# Patient Record
Sex: FEMALE | URBAN - METROPOLITAN AREA
[De-identification: names, ages, dates, MRNs, and addresses within clinical notes are randomized per-mention and may not be internally consistent; named-entity substitution may affect disease eponyms.]

---

## 2023-01-10 ENCOUNTER — TELEPHONE (OUTPATIENT)
Dept: BARIATRICS/WEIGHT MGMT | Age: 23
End: 2023-01-10

## 2023-02-08 ENCOUNTER — TELEPHONE (OUTPATIENT)
Dept: BARIATRICS/WEIGHT MGMT | Age: 23
End: 2023-02-08

## 2023-02-08 NOTE — TELEPHONE ENCOUNTER
Called as a new pt courtesy call - left message. Told patient to have new pt paperwork completely filled out, insurance card, id. If they didn't have the paperwork filled out and arrive on time may be rescheduled. Also stated if they didn't receive paperwork to let us know so we could get it to them another way. Left office number on message. Reminded patient of the [place] location.

## 2023-02-09 ENCOUNTER — OFFICE VISIT (OUTPATIENT)
Dept: BARIATRICS/WEIGHT MGMT | Age: 23
End: 2023-02-09

## 2023-02-09 VITALS
WEIGHT: 233 LBS | SYSTOLIC BLOOD PRESSURE: 122 MMHG | BODY MASS INDEX: 41.29 KG/M2 | HEIGHT: 63 IN | HEART RATE: 78 BPM | DIASTOLIC BLOOD PRESSURE: 72 MMHG

## 2023-02-09 DIAGNOSIS — E66.01 MORBID OBESITY WITH BMI OF 40.0-44.9, ADULT (HCC): Primary | ICD-10-CM

## 2023-02-09 PROCEDURE — 99999 PR OFFICE/OUTPT VISIT,PROCEDURE ONLY: CPT | Performed by: FAMILY MEDICINE

## 2023-02-09 NOTE — PROGRESS NOTES
Garfield Uriostegui is a 25 y.o. female with a date of birth of 2000. Vitals:    02/09/23 1027   Weight: 233 lb (105.7 kg)   Height: 5' 3\" (1.6 m)    BMI: Body mass index is 41.27 kg/m². Obesity Classification: Class III    Weight History: Wt Readings from Last 3 Encounters:   02/09/23 233 lb (105.7 kg)       Patient's lowest adult weight was 170-180 lb at age 25. Patient's highest adult weight was 250 lb at age 25. Patient has participated in the following weight loss programs: , metabolife/herbalife and calorie restriction. Patient has not participated in meal replacement/liquid diets. Patient has not participated in weight loss medications. Patient is lactose intolerant but does not currently avoid anything as nothing bothers her if she consumes it. Patient does not have Adventist/cultural food concerns. Patient does not have food allergies. 24 hour recall/food frequency chart: Pt uses [employer] aamir to track her intakes and aims for 6465-1527 calories but ends up eating around ~2852-0520 calories per day. Breakfast: nothing   Snack: nothing OR yogurt and protein granola   Lunch: forgot lunch so ate at [employer] at work - 1 cup pasta with 1/4 cup marinara sauce with 2 fried chicken tenders and 1 apple OR packed lunch - meatballs with 2 cuties   Snack: nothing   Dinner: [employer] - 6 oz sirloin with 1 roll with house salad with ranch and green beans OR chicken with portioned out rice cup and broccoli   Snack: nothing   Drinks throughout the day: Drinks water, propel. Drinks about 64 oz per day. Do you drink alcohol? yes. How often/how much alcohol do you drink: 1-2 glasses of wine 1-2 times per month. Patient does have a hx of binge eating disorder but not as much currently. Patient does have grazing. Patient does not have night eating. Patient does have a history of emotional eating or eating out of boredom.     Pt exercises - does Biggest Loser workout DVD 3 x week for 30-45 minutes + stretching. Goals  Weight: 170 lbs   Health Improvement:  lose weight, get healthier, improve breathing     Assessment  Nutritional Needs: RMR=(9.99 x 105.7) + (6.25 x 160) - (4.92 x 22 y.o.) -161 = 1786 kcal x 1.4 (sedentary activity factor)= 2500 kcal - 1000 (for 2 lb weight loss/week)= 1500 kcal.    Plan  Plan/Recommendations: General weight loss/lifestyle modification strategies discussed (elicit support from others; identify saboteurs; non-food rewards, etc). Start 1500 kcal LC meal plan - provided and reviewed   Include breakfast and snack consistently - discussed goal of eating 4-5 x day - avoid grazing   Continue to track her intakes - discussed aiming for meal plan parameters   Limit liquid calories - alcohol   Avoid fried foods and choose leaner food prep methods / healthier alternatives   Encouraged pt to attend SG's to help with behavioral triggers   Continue with exercise   Optifast: not as interested in   Diet Medications: interested in     Handouts: 1500 kcal LC meal plan    PES Statement:  Overweight/Obesity related to increased caloric intake and decreased physical activity as evidenced by BMI. Body mass index is 41.27 kg/m². Will follow up as necessary.     Elham Nixon, RD, LD

## 2023-02-15 ENCOUNTER — TELEMEDICINE (OUTPATIENT)
Dept: BARIATRICS/WEIGHT MGMT | Age: 23
End: 2023-02-15
Payer: COMMERCIAL

## 2023-02-15 DIAGNOSIS — E66.01 MORBID OBESITY WITH BMI OF 40.0-44.9, ADULT (HCC): Primary | ICD-10-CM

## 2023-02-15 DIAGNOSIS — Z71.3 DIETARY COUNSELING AND SURVEILLANCE: ICD-10-CM

## 2023-02-15 PROCEDURE — 99204 OFFICE O/P NEW MOD 45 MIN: CPT | Performed by: FAMILY MEDICINE

## 2023-02-15 ASSESSMENT — ENCOUNTER SYMPTOMS
NAUSEA: 0
CHEST TIGHTNESS: 0
BLOOD IN STOOL: 0
PHOTOPHOBIA: 0
EYE PAIN: 0
WHEEZING: 0
COUGH: 0
ABDOMINAL PAIN: 0
APNEA: 0
CHOKING: 0
DIARRHEA: 0
VOMITING: 0
ABDOMINAL DISTENTION: 0
CONSTIPATION: 0
SHORTNESS OF BREATH: 0

## 2023-02-15 NOTE — PROGRESS NOTES
Patient: Federico Richard     Encounter Date: 2/15/2023    YOB: 2000               Age: 25 y.o. Patient identification was verified at the start of the visit. Patient-Reported Vitals 2/15/2023   Patient-Reported Height 5,4   Patient-Reported Pulse 76         BP Readings from Last 1 Encounters:   02/09/23 122/72       BMI Readings from Last 1 Encounters:   02/09/23 41.27 kg/m²       Pulse Readings from Last 1 Encounters:   02/09/23 78                                             Wt Readings from Last 3 Encounters:   02/09/23 233 lb (105.7 kg)        Chief Complaint   Patient presents with    Bariatric, Initial Visit     MWM- NP        HPI:    25 y.o. female presents to establish care via video visit. The patient's medical history is significant for class III obesity. The patient has a long-standing history of obesity which started gradually. The problem is severe. The patient has been gaining weight. Risk factors include annual weight gain of >2 lbs (1 kg)/ year and sedentary lifestyle. Aggravating factors include poor diet and lack of physical activity. The patient has tried various diet/exercise plans which have been ineffective in the long-run. she is motivated to start losing weight to help improve her health. When did you become overweight? [] Childhood   [x] Teens   [] Adulthood   [] Pregnancy   [] Menopause    Highest adult weight: 250 pounds at current age     Triggers for weight gain? [x] Stress   [] Illness   [] Medications   [] Travel  []Injury     [] Nightshift work   [] Insomnia  [] No specific triggers   [] Other    Food triggers:   [x] Stress   [x] Boredom   [x] Fast food   [x] Eating out   [] Seeking reward   [] Social     Have you ever taken prescription medications to help you lose weight?    [] Yes  [x] No    Have you ever been on a meal replacement program?  [] Yes  [x] No    How many hours of sleep do you get each night?  [] <6 hours  [x] 6-8 hours  [] >8 hours    Do you have sleep apnea? [] Yes  [] No   [x] Unknown     Do you have frequent awakenings, difficulty falling asleep, feeling fatigued, unrefreshed sleep, daytime sleepiness? [] Yes  [x] No     The patient denies any significant cardiac or psychiatric disease. The patient denies a history thyroid disease. The patient denies a history of glaucoma. The patient denies a history of nephrolithiasis. The patient denies a history of seizure disorders/epiliepsy. Dietitian's assessment reviewed and addressed with patient. Reviewed:  [x] Nutrition and the importance of regular protein intake  [x] Hidden CHO/carbohydrate sources  [x] Alcohol use  [x] Tobacco use  [x] Drug use- Denies   [x] Importance of exercise and reducing sedentary time        Controlled Substance Monitoring:     No flowsheet data found. Allergies   Allergen Reactions    Amoxicillin-Pot Clavulanate     Macrodantin [Nitrofurantoin]     Sulfa Antibiotics     Penicillins Hives, Itching and Rash       No current outpatient medications on file. Patient Active Problem List   Diagnosis    Morbid obesity with BMI of 40.0-44.9, adult ([facility] [address]       Past Medical History:   Diagnosis Date    Asthma     Morbid obesity with BMI of 40.0-44.9, adult ([facility] [address]     MTHFR (methylene THF reductase) deficiency and homocystinuria (HCC)     Obesity (BMI 30-39. 9)        Past Surgical History:   Procedure Laterality Date    APPENDECTOMY      TONSILLECTOMY AND ADENOIDECTOMY         Family History   Problem Relation Age of Onset    Other Mother     Lupus Mother     Rheum Arthritis Mother     Diabetes Maternal Grandfather     Breast Cancer Paternal Grandmother        Review of Systems   Constitutional:  Negative for fatigue. Eyes:  Negative for photophobia, pain and visual disturbance. Respiratory:  Negative for apnea, cough, choking, chest tightness, shortness of breath and wheezing. Cardiovascular:  Negative for chest pain, palpitations and leg swelling. Gastrointestinal:  Negative for abdominal distention, abdominal pain, blood in stool, constipation, diarrhea, nausea and vomiting. Endocrine: Negative for cold intolerance and heat intolerance. Musculoskeletal:  Negative for arthralgias and myalgias. Skin:  Negative for rash. Neurological:  Negative for dizziness, tremors, syncope, weakness, numbness and headaches. Psychiatric/Behavioral:  Negative for agitation, confusion, decreased concentration, dysphoric mood, hallucinations, sleep disturbance and suicidal ideas. The patient is not nervous/anxious and is not hyperactive. Physical Exam  Constitutional:       Appearance: She is well-developed. HENT:      Head: Normocephalic. Eyes:      Conjunctiva/sclera: Conjunctivae normal.   Abdominal:      General: Abdomen is protuberant. Musculoskeletal:         General: No swelling. Neurological:      Mental Status: She is alert and oriented to person, place, and time. Psychiatric:         Mood and Affect: Mood normal.         Behavior: Behavior normal.         Thought Content: Thought content normal.         Judgment: Judgment normal.       No results found for any previous visit. Assessment and Plan:    1. Morbid obesity with BMI of 40.0-44.9, adult ([facility] [address]  Heavily counseled on the importance of therapeutic lifestyle changes through diet and exercise. The patient understands that the goal of treatment is to reach and stay at a healthy weight. The initial treatment goal is to lose at least 5-10% of her body weight in 12 weeks. This will require changes in eating habits, increased physical activity, and behavior changes. Counseled on low carb/majo diet. Patient handouts and education material provided  by the dietitian and reviewed in detail with the patient. Discussed available treatment options in addition to lifestyle changes including medications or OPTIFAST. She is interested in anti-obesity medications.  Qsymia or Contrave may be recommended at the next visit depending on her progress and lab results. Explained that medications/meal replacements are most effective as part of a comprehensive treatment plan that includes proper nutrition, physical activity, and behavior modification. The patient understands that she will need close follow-ups every 2-4 weeks if she starts treatment. Depending on the patient's success with these changes, she may also be a good candidate for bariatric surgery down the line. Follow-up in 2 weeks to discuss lab results and treatment plan. Patient advised that it is her responsibility to follow up on any ordered tests/lab results. Patient understands and agrees with the plan. - Comprehensive Metabolic Panel; Future  - EKG 12 Lead; Future  - Hemoglobin A1C; Future  - Lipid Panel; Future  - TSH with Reflex; Future  - Vitamin B12 & Folate; Future  - Vitamin D 25 Hydroxy; Future    2. Dietary counseling and surveillance  Start with meal plan as recommended. Avoid skipping meals. Avoid evening/nighttime snacking. Use distraction techniques to avoid boredom/stress eating. Plan/prep meals ahead of time. Avoid soda/juice/sugary drinks. Be mindful of portion sizes.          Nutrition:  [] LCHF/Ketogenic [x] Low carb/low-calorie diet [] Low-calorie diet     []Maintenance        FITTE:   [x] Cardio [] Resistance/stength exercises   [x] ACSM recommendations (150 minutes/week)           Behavior:   [x] Motivational interviewing performed    [] Referral for counseling  [x] Discussed strategies to overcome habits/challenges for focus      [x] Stress management   [x] Stimulus control  [x] Sleep hygiene      Orders Placed This Encounter   Procedures    Comprehensive Metabolic Panel     Standing Status:   Future     Standing Expiration Date:   2/15/2024    Hemoglobin A1C     Standing Status:   Future     Standing Expiration Date:   2/15/2024    Lipid Panel     Standing Status:   Future     Standing Expiration Date: 2/15/2024    TSH with Reflex     Standing Status:   Future     Standing Expiration Date:   2/15/2024    Vitamin B12 & Folate     Standing Status:   Future     Standing Expiration Date:   2/15/2024    Vitamin D 25 Hydroxy     Standing Status:   Future     Standing Expiration Date:   2/15/2024    EKG 12 Lead     Standing Status:   Future     Standing Expiration Date:   2/15/2024     Order Specific Question:   Reason for Exam?     Answer: Other         No follow-ups on file. Vito Colunga is a 25 y.o. female being evaluated by a Virtual Visit (video visit) encounter to address concerns as mentioned above. A caregiver was present when appropriate. Due to this being a TeleHealth encounter (During [facility] public health emergency), evaluation of the following organ systems was limited: Vitals/Constitutional/EENT/Resp/CV/GI//MS/Neuro/Skin/Heme-Lymph-Imm. Pursuant to the emergency declaration under the [address] [place] [address] and the [employer] and [employer] Act, this Virtual Visit was conducted with patient's (and/or legal guardian's) consent, to reduce the patient's risk of exposure to COVID-19 and provide necessary medical care. The patient (and/or legal guardian) has also been advised to contact this office for worsening conditions or problems, and seek emergency medical treatment and/or call 911 if deemed necessary. Services were provided through a video synchronous discussion virtually to substitute for in-person clinic visit. Patient and provider were located at their individual homes. Vito Colunga, was evaluated through a synchronous (real-time) audio-video encounter. The patient (or guardian if applicable) is aware that this is a billable service, which includes applicable co-pays. This Virtual Visit was conducted with patient's (and/or legal guardian's) consent.  The visit was conducted pursuant to the emergency declaration under the [facility] authority and the [employer] and [employer] General Act. Patient identification was verified, and a caregiver was present when appropriate. The patient was located at Home: [address] [place] [address]  Provider was located at Home ([facility] [place]         Total time spent for this encounter: Not billed by time    --Konrad Fagan MD on 2/15/2023 at 12:58 PM    An electronic signature was used to authenticate this note.

## 2023-02-24 ENCOUNTER — HOSPITAL ENCOUNTER (OUTPATIENT)
Age: 23
Discharge: HOME | End: 2023-02-24
Payer: COMMERCIAL

## 2023-02-24 DIAGNOSIS — E66.01 MORBID OBESITY WITH BMI OF 40.0-44.9, ADULT (HCC): ICD-10-CM

## 2023-02-24 LAB
A/G RATIO: 1.5 (ref 1.1–2.2)
ALBUMIN SERPL-MCNC: 4.6 G/DL (ref 3.4–5)
ALP BLD-CCNC: 49 U/L (ref 40–129)
ALT SERPL-CCNC: 11 U/L (ref 10–40)
ANION GAP SERPL CALCULATED.3IONS-SCNC: 15 MMOL/L (ref 3–16)
AST SERPL-CCNC: 18 U/L (ref 15–37)
BILIRUB SERPL-MCNC: 0.6 MG/DL (ref 0–1)
BUN BLDV-MCNC: 8 MG/DL (ref 7–20)
CALCIUM SERPL-MCNC: 9.7 MG/DL (ref 8.3–10.6)
CHLORIDE BLD-SCNC: 102 MMOL/L (ref 99–110)
CHOLESTEROL, TOTAL: 148 MG/DL (ref 0–199)
CO2: 23 MMOL/L (ref 21–32)
CREAT SERPL-MCNC: 0.6 MG/DL (ref 0.6–1.1)
EKG ATRIAL RATE: 74 BPM
EKG DIAGNOSIS: NORMAL
EKG P AXIS: 17 DEGREES
EKG P-R INTERVAL: 146 MS
EKG Q-T INTERVAL: 370 MS
EKG QRS DURATION: 84 MS
EKG QTC CALCULATION (BAZETT): 410 MS
EKG R AXIS: 23 DEGREES
EKG T AXIS: 21 DEGREES
EKG VENTRICULAR RATE: 74 BPM
FOLATE: 9.23 NG/ML (ref 4.78–24.2)
GFR SERPL CREATININE-BSD FRML MDRD: >60 ML/MIN/{1.73_M2}
GLUCOSE BLD-MCNC: 107 MG/DL (ref 70–99)
HDLC SERPL-MCNC: 52 MG/DL (ref 40–60)
LDL CHOLESTEROL CALCULATED: 87 MG/DL
POTASSIUM SERPL-SCNC: 4.4 MMOL/L (ref 3.5–5.1)
SODIUM BLD-SCNC: 140 MMOL/L (ref 136–145)
TOTAL PROTEIN: 7.7 G/DL (ref 6.4–8.2)
TRIGL SERPL-MCNC: 47 MG/DL (ref 0–150)
TSH REFLEX: 1.17 UIU/ML (ref 0.27–4.2)
VITAMIN B-12: 462 PG/ML (ref 211–911)
VITAMIN D 25-HYDROXY: 13.7 NG/ML
VLDLC SERPL CALC-MCNC: 9 MG/DL

## 2023-02-24 PROCEDURE — 36415 COLL VENOUS BLD VENIPUNCTURE: CPT

## 2023-02-24 PROCEDURE — 82746 ASSAY OF FOLIC ACID SERUM: CPT

## 2023-02-24 PROCEDURE — 80061 LIPID PANEL: CPT

## 2023-02-24 PROCEDURE — 84443 ASSAY THYROID STIM HORMONE: CPT

## 2023-02-24 PROCEDURE — 82607 VITAMIN B-12: CPT

## 2023-02-24 PROCEDURE — 82306 VITAMIN D 25 HYDROXY: CPT

## 2023-02-24 PROCEDURE — 80053 COMPREHEN METABOLIC PANEL: CPT

## 2023-02-24 PROCEDURE — 93005 ELECTROCARDIOGRAM TRACING: CPT

## 2023-02-24 PROCEDURE — 83036 HEMOGLOBIN GLYCOSYLATED A1C: CPT

## 2023-02-25 LAB
ESTIMATED AVERAGE GLUCOSE: 111.2 MG/DL
HBA1C MFR BLD: 5.5 %

## 2023-03-02 ENCOUNTER — TELEMEDICINE (OUTPATIENT)
Dept: BARIATRICS/WEIGHT MGMT | Age: 23
End: 2023-03-02
Payer: COMMERCIAL

## 2023-03-02 DIAGNOSIS — E55.9 VITAMIN D DEFICIENCY: ICD-10-CM

## 2023-03-02 DIAGNOSIS — Z71.3 DIETARY COUNSELING AND SURVEILLANCE: ICD-10-CM

## 2023-03-02 DIAGNOSIS — E66.01 MORBID OBESITY WITH BMI OF 40.0-44.9, ADULT (HCC): Primary | ICD-10-CM

## 2023-03-02 PROCEDURE — 99214 OFFICE O/P EST MOD 30 MIN: CPT | Performed by: FAMILY MEDICINE

## 2023-03-02 RX ORDER — ERGOCALCIFEROL 1.25 MG/1
50000 CAPSULE ORAL WEEKLY
Qty: 8 CAPSULE | Refills: 0 | Status: SHIPPED | OUTPATIENT
Start: 2023-03-02

## 2023-03-02 RX ORDER — NALTREXONE HYDROCHLORIDE AND BUPROPION HYDROCHLORIDE 8; 90 MG/1; MG/1
TABLET, EXTENDED RELEASE ORAL
Qty: 120 TABLET | Refills: 0 | Status: SHIPPED | OUTPATIENT
Start: 2023-03-02

## 2023-03-02 ASSESSMENT — ENCOUNTER SYMPTOMS
ABDOMINAL PAIN: 0
WHEEZING: 0
CONSTIPATION: 0
PHOTOPHOBIA: 0
DIARRHEA: 0
BLOOD IN STOOL: 0
NAUSEA: 0
ABDOMINAL DISTENTION: 0
VOMITING: 0
APNEA: 0
COUGH: 0
CHEST TIGHTNESS: 0
SHORTNESS OF BREATH: 0
CHOKING: 0
EYE PAIN: 0

## 2023-03-02 NOTE — PROGRESS NOTES
Patient: Garfield Uriostegui                      Encounter Date: 3/2/2023    YOB: 2000                Age: 25 y.o. Chief Complaint   Patient presents with    Weight Management     F/u MWM         Patient identification was verified at the start of the visit. Patient-Reported Vitals 3/2/2023   Patient-Reported Weight 227.8   Patient-Reported Height 54   Patient-Reported Pulse -         BP Readings from Last 1 Encounters:   02/09/23 122/72       BMI Readings from Last 1 Encounters:   02/09/23 41.27 kg/m²       Pulse Readings from Last 1 Encounters:   02/09/23 78          Wt Readings from Last 3 Encounters:   02/09/23 233 lb (105.7 kg)        HPI: 25 y.o. female with a long-standing history of obesity presents today for virtual video follow-up. she has lost 6 pounds since her last visit. Current treatment includes low carb/majo diet. Tolerating it well. Food recall reviewed with the patient. Making better dietary choices. Motivated to continue losing weight. Interested in aom to help with appetite regulation. Labs completed and reviewed with patient     Vit D 13.7    Allergies   Allergen Reactions    Amoxicillin-Pot Clavulanate     Macrodantin [Nitrofurantoin]     Sulfa Antibiotics     Penicillins Hives, Itching and Rash         Current Outpatient Medications:     naltrexone-buPROPion (CONTRAVE) 8-90 MG per extended release tablet, Start:1 tab po qam, then 1 tab po bid x 1 wk, then 2 tabs po qam and 1 tab po qpm x 1 wk; Max 4 tabs/day, Disp: 120 tablet, Rfl: 0    vitamin D (ERGOCALCIFEROL) 1.25 MG (55854 UT) CAPS capsule, Take 1 capsule by mouth once a week, Disp: 8 capsule, Rfl: 0    Patient Active Problem List   Diagnosis    Morbid obesity with BMI of 40.0-44.9, adult ([facility] [address]       Review of Systems   Constitutional:  Negative for fatigue. Eyes:  Negative for photophobia, pain and visual disturbance.    Respiratory:  Negative for apnea, cough, choking, chest tightness, shortness of breath and wheezing. Cardiovascular:  Negative for chest pain, palpitations and leg swelling. Gastrointestinal:  Negative for abdominal distention, abdominal pain, blood in stool, constipation, diarrhea, nausea and vomiting. Endocrine: Negative for cold intolerance and heat intolerance. Musculoskeletal:  Negative for arthralgias and myalgias. Skin:  Negative for rash. Neurological:  Negative for dizziness, tremors, syncope, weakness, numbness and headaches. Psychiatric/Behavioral:  Negative for agitation, confusion, decreased concentration, dysphoric mood, hallucinations, sleep disturbance and suicidal ideas. The patient is not nervous/anxious and is not hyperactive. Physical Exam  Constitutional:       Appearance: She is well-developed. HENT:      Head: Normocephalic. Eyes:      Conjunctiva/sclera: Conjunctivae normal.   Abdominal:      General: Abdomen is protuberant. Musculoskeletal:         General: No swelling. Neurological:      Mental Status: She is alert and oriented to person, place, and time. Psychiatric:         Mood and Affect: Mood normal.         Behavior: Behavior normal.         Thought Content:  Thought content normal.         Judgment: Judgment normal.       Hospital Outpatient Visit on 02/24/2023   Component Date Value Ref Range Status    Ventricular Rate 02/24/2023 74  BPM Final    Atrial Rate 02/24/2023 74  BPM Final    P-R Interval 02/24/2023 146  ms Final    QRS Duration 02/24/2023 84  ms Final    Q-T Interval 02/24/2023 370  ms Final    QTc Calculation (Bazett) 02/24/2023 410  ms Final    P Axis 02/24/2023 17  degrees Final    R Axis 02/24/2023 23  degrees Final    T Axis 02/24/2023 21  degrees Final    Diagnosis 02/24/2023 Normal sinus rhythm with sinus arrhythmiaNormal ECGNo previous ECGs availableConfirmed by Dustin Floyd MD, Jimy Wilson ([id number]) on 2/24/2023 1:19:31 PM   Final    Sodium 02/24/2023 140  136 - 145 mmol/L Final    Potassium 02/24/2023 4.4  3.5 - 5.1 mmol/L Final Chloride 02/24/2023 102  99 - 110 mmol/L Final    CO2 02/24/2023 23  21 - 32 mmol/L Final    Anion Gap 02/24/2023 15  3 - 16 Final    Glucose 02/24/2023 107 (A)  70 - 99 mg/dL Final    BUN 02/24/2023 8  7 - 20 mg/dL Final    Creatinine 02/24/2023 0.6  0.6 - 1.1 mg/dL Final    Est, Glom Filt Rate 02/24/2023 >60  >60 Final    Comment: Pediatric calculator link  [email or web address].[email or web address] org/professionals/kdoqi/gfr_calculatorped  Effective Oct 3, 2022  These results are not intended for use in patients  <25years of age. eGFR results are calculated without  a race factor using the 2021 CKD-EPI equation. Careful  clinical correlation is recommended, particularly when  comparing to results calculated using previous equations. The CKD-EPI equation is less accurate in patients with  extremes of muscle mass, extra-renal metabolism of  creatinine, excessive creatinine ingestion, or following  therapy that affects renal tubular secretion. Calcium 02/24/2023 9.7  8.3 - 10.6 mg/dL Final    Total Protein 02/24/2023 7.7  6.4 - 8.2 g/dL Final    Albumin 02/24/2023 4.6  3.4 - 5.0 g/dL Final    Albumin/Globulin Ratio 02/24/2023 1.5  1.1 - 2.2 Final    Total Bilirubin 02/24/2023 0.6  0.0 - 1.0 mg/dL Final    Alkaline Phosphatase 02/24/2023 49  40 - 129 U/L Final    ALT 02/24/2023 11  10 - 40 U/L Final    AST 02/24/2023 18  15 - 37 U/L Final    Hemoglobin A1C 02/24/2023 5.5  See comment % Final    Comment: Comment:  Diagnosis of Diabetes: > or = 6.5%  Increased risk of diabetes (Prediabetes): 5.7-6.4%  Glycemic Control: Nonpregnant Adults: <7.0%                    Pregnant: <6.0%        eAG 02/24/2023 111.2  mg/dL Final    Cholesterol, Total 02/24/2023 148  0 - 199 mg/dL Final    Triglycerides 02/24/2023 47  0 - 150 mg/dL Final    HDL 02/24/2023 52  40 - 60 mg/dL Final    Comment: An HDL cholesterol less than 40 mg/dL is low and  constitutes a coronary heart disease risk factor.   An HDL cholesterol greater than 60 mg/dL is a  negative risk factor for coronary heart disease. LDL Calculated 02/24/2023 87  <100 mg/dL Final    VLDL Cholesterol Calculated 02/24/2023 9  Not Established mg/dL Final    TSH 02/24/2023 1.17  0.27 - 4.20 uIU/mL Final    Vitamin B-12 02/24/2023 462  211 - 911 pg/mL Final    Folate 02/24/2023 9.23  4.78 - 24.20 ng/mL Final    Comment: Effective 11-15-16 10:00am EST  Please note reference ranges have  changed for Folate. Vit D, 25-Hydroxy 02/24/2023 13.7 (A)  >=30 ng/mL Final    Comment: <=20 ng/mL. ........... Radha Aland Deficient  21-29 ng/mL. ......... Radha Aland Insufficient  >=30 ng/mL. ........ Radha Aland Sufficient           Assessment and Plan:  1. Morbid obesity with BMI of 40.0-44.9, adult ([facility] [address]  Discussed risks, benefits and alternatives of Contrave. Patient meets BMI criteria. she denies MAOI use within the past 14 days, is not on any opioids, and does not have a seizure disorder. Start Contrave 8/90 mg. Use as directed. F/u in 2 weeks before titrating up to 3 pills a day. Explained to patient that I will monitor her weight loss every 12 weeks. Goal is to lose at least 5% of her body weight. Counseled patient on proper use and potential side effects including nausea/vomiting, constipation, headache, dizziness, insomnia, xerostomia, diarrhea, anxiety, increased blood pressure, flushing, fatigue, tremors, irritability, rash and/or palpitations. she understands that it is her responsibility to make sure that she does not run out of medications and to follow up to her appointments every 2-4 weeks as recommended. Heavily counseled on the importance of therapeutic lifestyle changes through diet and exercise. Patient is responsible for keeping her monthly appointments. Failure to comply with her monthly visits will result in discontinuing Contrave. Patient advised to report any side effects. 2. Dietary counseling and surveillance  Low carb/majo meal plan as prescribed.      3. Vitamin D deficiency  Start Vit D 50,000 IU weekly x 8 weeks. Nutrition Plan: [] LCHF/Ketogenic   [x] Modified low-calorie diet (low carb/low-majo)               [] Low-calorie diet    [] Maintenance       []Other        Exercise: [x] Cardio     [x] Resistance/strength training                       [x] ACSM recommendations (150 minutes/week in active weight loss)               Behavior: [x] Motivational interviewing performed    [] Referral for counseling                         [x] Discussed strategies to overcome habits/challenges for focus         [] Stress management   [x] Stimulus control         [] Sleep hygiene      Reviewed:  [x] Nutrition and the importance of regular protein intake  [x] Hidden carbohydrate sources  [x] Alcohol use  [x] Tobacco use   [x] Drug use- Denies  [x] Importance of exercise and reducing sedentary time  [x] Treatment consent- Patient understands and agrees with the treatment plan   [x] Proper use of medication and side effects  [x] Labs  [x] EKG    Controlled Substance Monitoring:    No flowsheet data found. Patient denies any history of cardiovascular disease (e.g., CAD, stroke, arrhythmias, CHF, uncontrolled HTN), seizure disorder, MAOI use within the last 2 weeks, hyperthyroidism, glaucoma, agitated states, history of drug abuse, pregnancy, nursing, known hypersensitivity to the prescribing meds, history of pancreatitis, or personal or family history of thyroid medullary cancer. Treatment start date: 3/3/23  12 weeks from start of therapeutic dose: 7/3/23  Starting weight: 233 pounds    Goal: At least 5% (11.5 pounds)         Key dietary points:    - Meats (preferably organic or grass fed) are great sources of protein and have no carbohydrates. - Recommend coconut, olive, avocado, or almond oils. - When buying dairy, choose regular or full fat options.   - Choose vegetables that grow above ground as they are generally lower in carbohydrates and higher in fiber.  - Avoid starches such as bread, rice, potatoes, pasta and all sources of simple sugars (desserts, soda, breakfast cereals). - Choose beverages that are calorie and sugar free. Reminder regarding weight loss medications: You must be seen in office every 2-4 weeks to haveyour prescriptions refilled. If you are off of your medication for longer than 7 days, you will not be able to restart the medication for at least 6 months. Always call our office to report any side effects. Females, it is your responsibility to obtain negative pregnancy tests each month. No orders of the defined types were placed in this encounter. No follow-ups on file. Jhoan Sahni is a 25 y.o. female being evaluated by a Virtual Visit (video visit) encounter to address concerns as mentioned above. A caregiver was present when appropriate. Due to this being a TeleHealth encounter evaluation of the following organ systems was limited: Vitals/Constitutional/EENT/Resp/CV/GI//MS/Neuro/Skin/Heme-Lymph-Imm. Jhoan Sahni, was evaluated through a synchronous (real-time) audio-video encounter. The patient (or guardian if applicable) is aware that this is a billable service, which includes applicable co-pays. This Virtual Visit was conducted with patient's (and/or legal guardian's) consent. The visit was conducted pursuant to the emergency declaration under the [address] [place] [address], [address] [place] [address] authority and the [employer] and [employer] General Act. Patient identification was verified, and a caregiver was present when appropriate. The patient was located at Home: [address] [place] [address]  Provider was located at Home ([facility]): [place]         Total time spent for this encounter: Not billed by time    --Bridget Hayes MD on 3/2/2023 at 2:26 PM    An electronic signature was used to authenticate this note.

## 2023-03-18 ENCOUNTER — TELEMEDICINE (OUTPATIENT)
Dept: BARIATRICS/WEIGHT MGMT | Age: 23
End: 2023-03-18
Payer: COMMERCIAL

## 2023-03-18 DIAGNOSIS — Z71.3 DIETARY COUNSELING AND SURVEILLANCE: ICD-10-CM

## 2023-03-18 DIAGNOSIS — E66.01 MORBID OBESITY WITH BMI OF 40.0-44.9, ADULT (HCC): Primary | ICD-10-CM

## 2023-03-18 PROCEDURE — 99214 OFFICE O/P EST MOD 30 MIN: CPT | Performed by: FAMILY MEDICINE

## 2023-03-18 ASSESSMENT — ENCOUNTER SYMPTOMS
CHOKING: 0
ABDOMINAL DISTENTION: 0
BLOOD IN STOOL: 0
VOMITING: 0
NAUSEA: 0
APNEA: 0
CONSTIPATION: 0
SHORTNESS OF BREATH: 0
EYE PAIN: 0
WHEEZING: 0
DIARRHEA: 0
CHEST TIGHTNESS: 0
PHOTOPHOBIA: 0
COUGH: 0
ABDOMINAL PAIN: 0

## 2023-03-18 NOTE — PROGRESS NOTES
Patient: Ar Bardales                      Encounter Date: 3/18/2023    YOB: 2000                Age: 25 y.o. Chief Complaint   Patient presents with    Weight Management     F/u MWM           Patient identification was verified at the start of the visit. Patient-Reported Vitals 3/18/2023   Patient-Reported Weight 223.8   Patient-Reported Height -   Patient-Reported Pulse -         BP Readings from Last 1 Encounters:   02/09/23 122/72       BMI Readings from Last 1 Encounters:   02/09/23 41.27 kg/m²       Pulse Readings from Last 1 Encounters:   02/09/23 78            HPI: 25 y.o. female with a long-standing history of obesity presents today for virtual video follow-up. she has lost 4 pounds since her last visit. Current treatment includes Contrave and low carb/majo diet. She is up to 3 pills/day. Tolerating it well. Food recall reviewed with the patient. Continuing to make good dietary choices. Happy with progress. Medication(s): Appetite well controlled? [x]Yes      []No    Focus:     [x]Good     []Fair     []Poor    Side effects? No        Any recent change in medication(s)? No     Exercise: [x]Cardio     [x]Resistance/strength training     []Other:     Allergies   Allergen Reactions    Amoxicillin-Pot Clavulanate     Macrodantin [Nitrofurantoin]     Sulfa Antibiotics     Penicillins Hives, Itching and Rash         Current Outpatient Medications:     naltrexone-buPROPion (CONTRAVE) 8-90 MG per extended release tablet, Start:1 tab po qam, then 1 tab po bid x 1 wk, then 2 tabs po qam and 1 tab po qpm x 1 wk; Max 4 tabs/day, Disp: 120 tablet, Rfl: 0    vitamin D (ERGOCALCIFEROL) 1.25 MG (88697 UT) CAPS capsule, Take 1 capsule by mouth once a week, Disp: 8 capsule, Rfl: 0    Patient Active Problem List   Diagnosis    Morbid obesity with BMI of 40.0-44.9, adult ([facility] [address]       Review of Systems   Constitutional:  Negative for fatigue.    Eyes:  Negative for photophobia, pain and visual disturbance. Respiratory:  Negative for apnea, cough, choking, chest tightness, shortness of breath and wheezing. Cardiovascular:  Negative for chest pain, palpitations and leg swelling. Gastrointestinal:  Negative for abdominal distention, abdominal pain, blood in stool, constipation, diarrhea, nausea and vomiting. Endocrine: Negative for cold intolerance and heat intolerance. Musculoskeletal:  Negative for arthralgias and myalgias. Skin:  Negative for rash. Neurological:  Negative for dizziness, tremors, syncope, weakness, numbness and headaches. Psychiatric/Behavioral:  Negative for agitation, confusion, decreased concentration, dysphoric mood, hallucinations, sleep disturbance and suicidal ideas. The patient is not nervous/anxious and is not hyperactive. Physical Exam  Constitutional:       Appearance: She is well-developed. HENT:      Head: Normocephalic. Eyes:      Conjunctiva/sclera: Conjunctivae normal.   Abdominal:      General: Abdomen is protuberant. Musculoskeletal:         General: No swelling. Neurological:      Mental Status: She is alert and oriented to person, place, and time. Psychiatric:         Mood and Affect: Mood normal.         Behavior: Behavior normal.         Thought Content:  Thought content normal.         Judgment: Judgment normal.       Hospital Outpatient Visit on 02/24/2023   Component Date Value Ref Range Status    Ventricular Rate 02/24/2023 74  BPM Final    Atrial Rate 02/24/2023 74  BPM Final    P-R Interval 02/24/2023 146  ms Final    QRS Duration 02/24/2023 84  ms Final    Q-T Interval 02/24/2023 370  ms Final    QTc Calculation (Bazett) 02/24/2023 410  ms Final    P Axis 02/24/2023 17  degrees Final    R Axis 02/24/2023 23  degrees Final    T Axis 02/24/2023 21  degrees Final    Diagnosis 02/24/2023 Normal sinus rhythm with sinus arrhythmiaNormal ECGNo previous ECGs availableConfirmed by Helen Campbell MD, Xander Hurd ([id number]) on 2/24/2023 1:19:31 PM   Final Sodium 02/24/2023 140  136 - 145 mmol/L Final    Potassium 02/24/2023 4.4  3.5 - 5.1 mmol/L Final    Chloride 02/24/2023 102  99 - 110 mmol/L Final    CO2 02/24/2023 23  21 - 32 mmol/L Final    Anion Gap 02/24/2023 15  3 - 16 Final    Glucose 02/24/2023 107 (A)  70 - 99 mg/dL Final    BUN 02/24/2023 8  7 - 20 mg/dL Final    Creatinine 02/24/2023 0.6  0.6 - 1.1 mg/dL Final    Est, Glom Filt Rate 02/24/2023 >60  >60 Final    Comment: Pediatric calculator link  [email or web address].[email or web address] org/professionals/kdoqi/gfr_calculatorped  Effective Oct 3, 2022  These results are not intended for use in patients  <25years of age. eGFR results are calculated without  a race factor using the 2021 CKD-EPI equation. Careful  clinical correlation is recommended, particularly when  comparing to results calculated using previous equations. The CKD-EPI equation is less accurate in patients with  extremes of muscle mass, extra-renal metabolism of  creatinine, excessive creatinine ingestion, or following  therapy that affects renal tubular secretion.       Calcium 02/24/2023 9.7  8.3 - 10.6 mg/dL Final    Total Protein 02/24/2023 7.7  6.4 - 8.2 g/dL Final    Albumin 02/24/2023 4.6  3.4 - 5.0 g/dL Final    Albumin/Globulin Ratio 02/24/2023 1.5  1.1 - 2.2 Final    Total Bilirubin 02/24/2023 0.6  0.0 - 1.0 mg/dL Final    Alkaline Phosphatase 02/24/2023 49  40 - 129 U/L Final    ALT 02/24/2023 11  10 - 40 U/L Final    AST 02/24/2023 18  15 - 37 U/L Final    Hemoglobin A1C 02/24/2023 5.5  See comment % Final    Comment: Comment:  Diagnosis of Diabetes: > or = 6.5%  Increased risk of diabetes (Prediabetes): 5.7-6.4%  Glycemic Control: Nonpregnant Adults: <7.0%                    Pregnant: <6.0%        eAG 02/24/2023 111.2  mg/dL Final    Cholesterol, Total 02/24/2023 148  0 - 199 mg/dL Final    Triglycerides 02/24/2023 47  0 - 150 mg/dL Final    HDL 02/24/2023 52  40 - 60 mg/dL Final    Comment: An HDL cholesterol less than 40 mg/dL is low and  constitutes a coronary heart disease risk factor. An HDL cholesterol greater than 60 mg/dL is a  negative risk factor for coronary heart disease. LDL Calculated 02/24/2023 87  <100 mg/dL Final    VLDL Cholesterol Calculated 02/24/2023 9  Not Established mg/dL Final    TSH 02/24/2023 1.17  0.27 - 4.20 uIU/mL Final    Vitamin B-12 02/24/2023 462  211 - 911 pg/mL Final    Folate 02/24/2023 9.23  4.78 - 24.20 ng/mL Final    Comment: Effective 11-15-16 10:00am EST  Please note reference ranges have  changed for Folate. Vit D, 25-Hydroxy 02/24/2023 13.7 (A)  >=30 ng/mL Final    Comment: <=20 ng/mL. ........... Sylvie Ruffing Deficient  21-29 ng/mL. ......... Sylvie Ruffing Insufficient  >=30 ng/mL. ........ Sylvie Ruffing Sufficient           Assessment and Plan:  1. Morbid obesity with BMI of 40.0-44.9, adult (HCC)  Improving, not at goal.  Continue current management- Contrave (increase dose as prescribed), low carb/majo diet and exercise. F/u 3-4 weeks. 2. Dietary counseling and surveillance  Low carb/majo meal plan as prescribed.         Nutrition Plan: [] LCHF/Ketogenic   [x] Modified low-calorie diet (low carb/low-majo)               [] Low-calorie diet    [] Maintenance       []Other        Exercise: [x] Cardio     [x] Resistance/strength training                       [x] ACSM recommendations (150 minutes/week in active weight loss)               Behavior: [x] Motivational interviewing performed    [] Referral for counseling                         [x] Discussed strategies to overcome habits/challenges for focus         [] Stress management   [x] Stimulus control         [] Sleep hygiene      Reviewed:  [x] Nutrition and the importance of regular protein intake  [x] Hidden carbohydrate sources  [x] Alcohol use  [x] Tobacco use   [x] Drug use- Denies  [x] Importance of exercise and reducing sedentary time  [x] Treatment consent- Patient understands and agrees with the treatment plan   [x] Proper use of medication and side effects      Treatment start date: 3/3/23  12 weeks from start of therapeutic dose: 7/3/23  Starting weight: 233 pounds    Goal: At least 5% (11.5 pounds)   Total weight loss: 10 pounds           Key dietary points:    - Meats (preferably organic or grass fed) are great sources of protein and have no carbohydrates. - Recommend coconut, olive, avocado, or almond oils. - When buying dairy, choose regular or full fat options. - Choose vegetables that grow above ground as they are generally lower in carbohydrates and higher in fiber.  - Avoid starches such as bread, rice, potatoes, pasta and all sources of simple sugars (desserts, soda, breakfast cereals). - Choose beverages that are calorie and sugar free. Reminder regarding weight loss medications: You must be seen in office every 2-4 weeks to haveyour prescriptions refilled. If you are off of your medication for longer than 7 days, you will not be able to restart the medication for at least 6 months. Always call our office to report any side effects. Females, it is your responsibility to obtain negative pregnancy tests each month. No orders of the defined types were placed in this encounter. No follow-ups on file. Eliseo Dillard is a 25 y.o. female being evaluated by a Virtual Visit (video visit) encounter to address concerns as mentioned above. A caregiver was present when appropriate. Due to this being a TeleHealth encounter evaluation of the following organ systems was limited: Vitals/Constitutional/EENT/Resp/CV/GI//MS/Neuro/Skin/Heme-Lymph-Imm. Eliseo Dillard, was evaluated through a synchronous (real-time) audio-video encounter. The patient (or guardian if applicable) is aware that this is a billable service, which includes applicable co-pays. This Virtual Visit was conducted with patient's (and/or legal guardian's) consent.  The visit was conducted pursuant to the emergency declaration under the [facility] [place] [address] waiver authority and the [employer] and [employer] General Act. Patient identification was verified, and a caregiver was present when appropriate. The patient was located at Home: [address]  Provider was located at Home ([facility]): [place]         Total time spent for this encounter: Not billed by time    --Kristen Erickson MD on 3/18/2023 at 11:20 AM    An electronic signature was used to authenticate this note.

## 2023-03-31 ENCOUNTER — TELEPHONE (OUTPATIENT)
Dept: BARIATRICS/WEIGHT MGMT | Age: 23
End: 2023-03-31

## 2023-04-17 ENCOUNTER — TELEPHONE (OUTPATIENT)
Dept: BARIATRICS/WEIGHT MGMT | Age: 23
End: 2023-04-17

## 2023-04-17 NOTE — TELEPHONE ENCOUNTER
Pt calling in, asking for refill of her contrave, she has an upcoming appt on 4/22/23, but pt is going to be out prior to that. I did offer a sooner appt but pt denied due to being at work.        Please advise on refill

## 2023-04-19 DIAGNOSIS — Z71.3 DIETARY COUNSELING AND SURVEILLANCE: Primary | ICD-10-CM

## 2023-04-19 DIAGNOSIS — E66.01 MORBID OBESITY WITH BMI OF 40.0-44.9, ADULT (HCC): ICD-10-CM

## 2023-04-19 NOTE — TELEPHONE ENCOUNTER
Left detailed voicemail  Refill sent to [facility] pharmacy  Pt should plan to keep current f/u appt 4/22  thanks

## 2023-04-22 ENCOUNTER — TELEMEDICINE (OUTPATIENT)
Dept: BARIATRICS/WEIGHT MGMT | Age: 23
End: 2023-04-22
Payer: COMMERCIAL

## 2023-04-22 DIAGNOSIS — Z71.3 DIETARY COUNSELING AND SURVEILLANCE: ICD-10-CM

## 2023-04-22 DIAGNOSIS — E66.01 MORBID OBESITY WITH BMI OF 40.0-44.9, ADULT (HCC): Primary | ICD-10-CM

## 2023-04-22 DIAGNOSIS — E55.9 VITAMIN D DEFICIENCY: ICD-10-CM

## 2023-04-22 PROCEDURE — 99214 OFFICE O/P EST MOD 30 MIN: CPT | Performed by: FAMILY MEDICINE

## 2023-04-22 RX ORDER — NALTREXONE HYDROCHLORIDE AND BUPROPION HYDROCHLORIDE 8; 90 MG/1; MG/1
2 TABLET, EXTENDED RELEASE ORAL 2 TIMES DAILY
Qty: 120 TABLET | Refills: 0 | Status: SHIPPED | OUTPATIENT
Start: 2023-04-22 | End: 2023-04-22 | Stop reason: CLARIF

## 2023-04-22 ASSESSMENT — ENCOUNTER SYMPTOMS
CONSTIPATION: 0
BLOOD IN STOOL: 0
VOMITING: 0
DIARRHEA: 0
APNEA: 0
SHORTNESS OF BREATH: 0
CHOKING: 0
COUGH: 0
CHEST TIGHTNESS: 0
PHOTOPHOBIA: 0
EYE PAIN: 0
WHEEZING: 0
ABDOMINAL PAIN: 0
NAUSEA: 0
ABDOMINAL DISTENTION: 0

## 2023-05-26 DIAGNOSIS — Z71.3 DIETARY COUNSELING AND SURVEILLANCE: ICD-10-CM

## 2023-05-26 DIAGNOSIS — E66.01 MORBID OBESITY WITH BMI OF 40.0-44.9, ADULT (HCC): Primary | ICD-10-CM

## 2023-06-02 ENCOUNTER — TELEMEDICINE (OUTPATIENT)
Dept: BARIATRICS/WEIGHT MGMT | Age: 23
End: 2023-06-02
Payer: COMMERCIAL

## 2023-06-02 DIAGNOSIS — Z71.3 DIETARY COUNSELING AND SURVEILLANCE: ICD-10-CM

## 2023-06-02 DIAGNOSIS — E66.01 MORBID OBESITY WITH BMI OF 40.0-44.9, ADULT (HCC): Primary | ICD-10-CM

## 2023-06-02 PROCEDURE — 99214 OFFICE O/P EST MOD 30 MIN: CPT | Performed by: FAMILY MEDICINE

## 2023-06-02 ASSESSMENT — ENCOUNTER SYMPTOMS
ABDOMINAL DISTENTION: 0
EYE PAIN: 0
COUGH: 0
APNEA: 0
ABDOMINAL PAIN: 0
SHORTNESS OF BREATH: 0
BLOOD IN STOOL: 0
CONSTIPATION: 0
NAUSEA: 0
CHEST TIGHTNESS: 0
DIARRHEA: 0
VOMITING: 0
CHOKING: 0
WHEEZING: 0
PHOTOPHOBIA: 0

## 2023-06-02 NOTE — PROGRESS NOTES
Patient: Mary Duenas                      Encounter Date: 6/2/2023    YOB: 2000                Age: 25 y.o. Chief Complaint   Patient presents with    Weight Management     F/u MWM         Patient identification was verified at the start of the visit. Patient-Reported Vitals 5/30/2023   Patient-Reported Weight 211.8   Patient-Reported Height 5  4   Patient-Reported Pulse -         BP Readings from Last 1 Encounters:   02/09/23 122/72       BMI Readings from Last 1 Encounters:   02/09/23 41.27 kg/m²       Pulse Readings from Last 1 Encounters:   02/09/23 78          Wt Readings from Last 3 Encounters:   02/09/23 233 lb (105.7 kg)        HPI: 25 y.o. female with a long-standing history of obesity presents today for virtual video follow-up. She has lost 7 pounds since her last visit. Current treatment includes Contrave and low carb/majo diet. Staying physically active. Happy with the weight loss. Medication(s): Appetite well controlled? [x]Yes      []No                          Focus:     [x]Good     []Fair     []Poor                          Side effects? No        Any recent change in medication(s)? No      Exercise: [x]Cardio     [x]Resistance/strength training     []Other:        Allergies   Allergen Reactions    Amoxicillin-Pot Clavulanate     Macrodantin [Nitrofurantoin]     Sulfa Antibiotics     Penicillins Hives, Itching and Rash         Current Outpatient Medications:     naltrexone-buPROPion (CONTRAVE) 8-90 MG per extended release tablet, Take 2 tablets by mouth 2 times daily, Disp: 120 tablet, Rfl: 0    vitamin D (ERGOCALCIFEROL) 1.25 MG (61009 UT) CAPS capsule, Take 1 capsule by mouth once a week, Disp: 8 capsule, Rfl: 0    Patient Active Problem List   Diagnosis    Morbid obesity with BMI of 40.0-44.9, adult ([facility])       Review of Systems   Constitutional:  Negative for fatigue. Eyes:  Negative for photophobia, pain and visual disturbance.    Respiratory:  Negative for apnea,

## 2023-06-23 ENCOUNTER — TELEMEDICINE (OUTPATIENT)
Dept: BARIATRICS/WEIGHT MGMT | Age: 23
End: 2023-06-23
Payer: COMMERCIAL

## 2023-06-23 VITALS — HEIGHT: 63 IN | BODY MASS INDEX: 37.88 KG/M2 | WEIGHT: 213.8 LBS

## 2023-06-23 DIAGNOSIS — Z71.3 DIETARY COUNSELING AND SURVEILLANCE: ICD-10-CM

## 2023-06-23 DIAGNOSIS — E66.9 CLASS 2 OBESITY: Primary | ICD-10-CM

## 2023-06-23 PROCEDURE — 99214 OFFICE O/P EST MOD 30 MIN: CPT | Performed by: FAMILY MEDICINE

## 2023-06-23 RX ORDER — NALTREXONE HYDROCHLORIDE AND BUPROPION HYDROCHLORIDE 8; 90 MG/1; MG/1
2 TABLET, EXTENDED RELEASE ORAL 2 TIMES DAILY
Qty: 120 TABLET | Refills: 0 | Status: SHIPPED | OUTPATIENT
Start: 2023-06-23

## 2023-06-23 ASSESSMENT — ENCOUNTER SYMPTOMS
COUGH: 0
APNEA: 0
ABDOMINAL DISTENTION: 0
CONSTIPATION: 0
BLOOD IN STOOL: 0
WHEEZING: 0
SHORTNESS OF BREATH: 0
CHOKING: 0
CHEST TIGHTNESS: 0
ABDOMINAL PAIN: 0
VOMITING: 0
PHOTOPHOBIA: 0
EYE PAIN: 0
DIARRHEA: 0
NAUSEA: 0

## 2023-06-23 NOTE — PROGRESS NOTES
Patient: Ashanti Hazel                      Encounter Date: 6/23/2023    YOB: 2000                Age: 25 y.o. Chief Complaint   Patient presents with    Weight Management     F/u MWM         Patient identification was verified at the start of the visit. Patient-Reported Vitals 6/23/2023   Patient-Reported Weight -   Patient-Reported Height 55   Patient-Reported Pulse -         BP Readings from Last 1 Encounters:   02/09/23 122/72       BMI Readings from Last 1 Encounters:   06/23/23 37.87 kg/m²       Pulse Readings from Last 1 Encounters:   02/09/23 78          Wt Readings from Last 3 Encounters:   06/23/23 213 lb 12.8 oz (97 kg)   02/09/23 233 lb (105.7 kg)        HPI: 25 y.o. female with a long-standing history of obesity presents today for virtual video follow-up. she has lost 30 pounds since starting Contrave in March. She just came back from vacation. Tried her best to stick to her diet. Motivated to continue losing weight. Medication(s): Appetite well controlled? [x]Yes      []No    Focus:     [x]Good     []Fair     []Poor    Side effects? No        Any recent change in medication(s)? No      Allergies   Allergen Reactions    Amoxicillin-Pot Clavulanate     Macrodantin [Nitrofurantoin]     Sulfa Antibiotics     Penicillins Hives, Itching and Rash         Current Outpatient Medications:     naltrexone-buPROPion (CONTRAVE) 8-90 MG per extended release tablet, Take 2 tablets by mouth 2 times daily, Disp: 120 tablet, Rfl: 0    vitamin D (ERGOCALCIFEROL) 1.25 MG (48399 UT) CAPS capsule, Take 1 capsule by mouth once a week, Disp: 8 capsule, Rfl: 0    Patient Active Problem List   Diagnosis    Morbid obesity with BMI of 40.0-44.9, adult ([facility])       Review of Systems   Constitutional:  Negative for fatigue. Eyes:  Negative for photophobia, pain and visual disturbance. Respiratory:  Negative for apnea, cough, choking, chest tightness, shortness of breath and wheezing.

## 2023-09-29 ENCOUNTER — OFFICE VISIT (OUTPATIENT)
Dept: INTERNAL MEDICINE CLINIC | Age: 23
End: 2023-09-29
Payer: COMMERCIAL

## 2023-09-29 VITALS
BODY MASS INDEX: 38.44 KG/M2 | SYSTOLIC BLOOD PRESSURE: 112 MMHG | HEART RATE: 84 BPM | DIASTOLIC BLOOD PRESSURE: 64 MMHG | OXYGEN SATURATION: 98 % | WEIGHT: 217 LBS

## 2023-09-29 DIAGNOSIS — Z71.85 VACCINE COUNSELING: ICD-10-CM

## 2023-09-29 DIAGNOSIS — Z01.419 WELL WOMAN EXAM: ICD-10-CM

## 2023-09-29 DIAGNOSIS — Z76.89 ENCOUNTER TO ESTABLISH CARE WITH NEW DOCTOR: Primary | ICD-10-CM

## 2023-09-29 DIAGNOSIS — Z00.00 ANNUAL PHYSICAL EXAM: ICD-10-CM

## 2023-09-29 DIAGNOSIS — N92.0 MENORRHAGIA WITH REGULAR CYCLE: ICD-10-CM

## 2023-09-29 DIAGNOSIS — Z11.3 SCREEN FOR STD (SEXUALLY TRANSMITTED DISEASE): ICD-10-CM

## 2023-09-29 DIAGNOSIS — E55.9 VITAMIN D DEFICIENCY: ICD-10-CM

## 2023-09-29 DIAGNOSIS — E66.9 OBESITY (BMI 35.0-39.9 WITHOUT COMORBIDITY): ICD-10-CM

## 2023-09-29 DIAGNOSIS — J45.909 ASTHMA, UNSPECIFIED ASTHMA SEVERITY, UNSPECIFIED WHETHER COMPLICATED, UNSPECIFIED WHETHER PERSISTENT: ICD-10-CM

## 2023-09-29 PROCEDURE — 90651 9VHPV VACCINE 2/3 DOSE IM: CPT | Performed by: NURSE PRACTITIONER

## 2023-09-29 PROCEDURE — 90471 IMMUNIZATION ADMIN: CPT | Performed by: NURSE PRACTITIONER

## 2023-09-29 PROCEDURE — 99385 PREV VISIT NEW AGE 18-39: CPT | Performed by: NURSE PRACTITIONER

## 2023-09-29 RX ORDER — ALBUTEROL SULFATE 90 UG/1
1-2 AEROSOL, METERED RESPIRATORY (INHALATION) EVERY 6 HOURS PRN
Qty: 18 G | Refills: 0 | Status: SHIPPED | OUTPATIENT
Start: 2023-09-29

## 2023-09-29 SDOH — ECONOMIC STABILITY: INCOME INSECURITY: HOW HARD IS IT FOR YOU TO PAY FOR THE VERY BASICS LIKE FOOD, HOUSING, MEDICAL CARE, AND HEATING?: NOT HARD AT ALL

## 2023-09-29 SDOH — ECONOMIC STABILITY: FOOD INSECURITY: WITHIN THE PAST 12 MONTHS, THE FOOD YOU BOUGHT JUST DIDN'T LAST AND YOU DIDN'T HAVE MONEY TO GET MORE.: NEVER TRUE

## 2023-09-29 SDOH — ECONOMIC STABILITY: HOUSING INSECURITY
IN THE LAST 12 MONTHS, WAS THERE A TIME WHEN YOU DID NOT HAVE A STEADY PLACE TO SLEEP OR SLEPT IN A SHELTER (INCLUDING NOW)?: NO

## 2023-09-29 SDOH — ECONOMIC STABILITY: FOOD INSECURITY: WITHIN THE PAST 12 MONTHS, YOU WORRIED THAT YOUR FOOD WOULD RUN OUT BEFORE YOU GOT MONEY TO BUY MORE.: NEVER TRUE

## 2023-09-29 SDOH — HEALTH STABILITY: PHYSICAL HEALTH: ON AVERAGE, HOW MANY DAYS PER WEEK DO YOU ENGAGE IN MODERATE TO STRENUOUS EXERCISE (LIKE A BRISK WALK)?: 3 DAYS

## 2023-09-29 SDOH — HEALTH STABILITY: PHYSICAL HEALTH: ON AVERAGE, HOW MANY MINUTES DO YOU ENGAGE IN EXERCISE AT THIS LEVEL?: 30 MIN

## 2023-09-29 ASSESSMENT — SOCIAL DETERMINANTS OF HEALTH (SDOH)
WITHIN THE LAST YEAR, HAVE TO BEEN RAPED OR FORCED TO HAVE ANY KIND OF SEXUAL ACTIVITY BY YOUR PARTNER OR EX-PARTNER?: NO
WITHIN THE LAST YEAR, HAVE YOU BEEN HUMILIATED OR EMOTIONALLY ABUSED IN OTHER WAYS BY YOUR PARTNER OR EX-PARTNER?: NO
WITHIN THE LAST YEAR, HAVE YOU BEEN KICKED, HIT, SLAPPED, OR OTHERWISE PHYSICALLY HURT BY YOUR PARTNER OR EX-PARTNER?: NO
WITHIN THE LAST YEAR, HAVE YOU BEEN AFRAID OF YOUR PARTNER OR EX-PARTNER?: NO

## 2023-09-29 ASSESSMENT — PATIENT HEALTH QUESTIONNAIRE - PHQ9
SUM OF ALL RESPONSES TO PHQ QUESTIONS 1-9: 0
2. FEELING DOWN, DEPRESSED OR HOPELESS: 0
1. LITTLE INTEREST OR PLEASURE IN DOING THINGS: 0
SUM OF ALL RESPONSES TO PHQ QUESTIONS 1-9: 0
SUM OF ALL RESPONSES TO PHQ QUESTIONS 1-9: 0
SUM OF ALL RESPONSES TO PHQ9 QUESTIONS 1 & 2: 0
SUM OF ALL RESPONSES TO PHQ QUESTIONS 1-9: 0

## 2023-09-29 ASSESSMENT — ENCOUNTER SYMPTOMS
DIARRHEA: 0
COUGH: 0
ABDOMINAL PAIN: 0
VOMITING: 0
SINUS PAIN: 0
NAUSEA: 0
WHEEZING: 0
SORE THROAT: 0
SHORTNESS OF BREATH: 0
CONSTIPATION: 0

## 2023-09-29 NOTE — PATIENT INSTRUCTIONS
Send vaccine records via [facility].       [facility], [address] [facility] [address], [place], [place], [address] [place] [address], [address] [place] [address], [address] [facility]   Phone: [phone]

## 2023-09-29 NOTE — PROGRESS NOTES
New Patient Office Visit   9/29/2023    Subjective:  Chief Complaint   Patient presents with    New Patient     HPI:  Maylin Greco is a 21 y.o. female who presents to the clinic today to establish care. Also due for an annual physical.    Asthma - pt states she was a premie and so she had \"lung issues as a baby. \" Now, just exercise induced. Rare albuterol use. Vit d deficiency- on supplements through weight management. Seeing weight management through [facility] [place]. Heavy menses- was on Nexplanon in the past. Looking into Nexplanon or IUD. Last pap smear- never  Requesting OBGYN referral.    Rush Cohenther mom. 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 Lives alone. For fun, she enjoys crafting and pilates. Review of Systems   Constitutional:  Negative for chills, fatigue and fever. HENT:  Negative for congestion, postnasal drip, sinus pain and sore throat. Respiratory:  Negative for cough, shortness of breath and wheezing. Cardiovascular:  Negative for chest pain, palpitations and leg swelling. Gastrointestinal:  Negative for abdominal pain, constipation, diarrhea, nausea and vomiting. Genitourinary:  Negative for dysuria, frequency, hematuria and urgency. Skin:  Negative for pallor and rash. Neurological:  Negative for dizziness, weakness, light-headedness, numbness and headaches. Psychiatric/Behavioral:  Negative for dysphoric mood, sleep disturbance and suicidal ideas. The patient is not nervous/anxious.       Allergies   Allergen Reactions    Amoxicillin-Pot Clavulanate     Macrodantin [Nitrofurantoin]     Sulfa Antibiotics     Penicillins Hives, Itching and Rash     Family History   Problem Relation Age of Onset    Lupus Mother     Rheum Arthritis Mother     Diabetes Maternal Grandfather     Breast Cancer Paternal Grandmother     Ovarian Cancer Neg Hx     Heart Attack Neg Hx     Stroke Neg Hx      Current Outpatient Rx   Medication Sig Dispense Refill    albuterol sulfate HFA (VENTOLIN HFA) 108 (90 Base) MCG/ACT

## 2023-10-03 ENCOUNTER — TELEMEDICINE (OUTPATIENT)
Dept: BARIATRICS/WEIGHT MGMT | Age: 23
End: 2023-10-03
Payer: COMMERCIAL

## 2023-10-03 DIAGNOSIS — E66.9 CLASS 2 OBESITY: Primary | ICD-10-CM

## 2023-10-03 DIAGNOSIS — Z71.3 DIETARY COUNSELING AND SURVEILLANCE: ICD-10-CM

## 2023-10-03 PROCEDURE — 99213 OFFICE O/P EST LOW 20 MIN: CPT | Performed by: FAMILY MEDICINE

## 2023-10-03 ASSESSMENT — ENCOUNTER SYMPTOMS
PHOTOPHOBIA: 0
CONSTIPATION: 0
COUGH: 0
ABDOMINAL DISTENTION: 0
NAUSEA: 0
APNEA: 0
VOMITING: 0
WHEEZING: 0
EYE PAIN: 0
BLOOD IN STOOL: 0
CHEST TIGHTNESS: 0
ABDOMINAL PAIN: 0
CHOKING: 0
SHORTNESS OF BREATH: 0
DIARRHEA: 0

## 2023-11-09 ENCOUNTER — HOSPITAL ENCOUNTER (OUTPATIENT)
Age: 23
Discharge: HOME | End: 2023-11-09
Payer: COMMERCIAL

## 2023-11-09 DIAGNOSIS — E66.9 CLASS 2 OBESITY: ICD-10-CM

## 2023-11-09 LAB
25(OH)D3 SERPL-MCNC: 21.1 NG/ML
ALBUMIN SERPL-MCNC: 4.6 G/DL (ref 3.4–5)
ALBUMIN/GLOB SERPL: 1.8 {RATIO} (ref 1.1–2.2)
ALP SERPL-CCNC: 46 U/L (ref 40–129)
ALT SERPL-CCNC: <5 U/L (ref 10–40)
ANION GAP SERPL CALCULATED.3IONS-SCNC: 9 MMOL/L (ref 3–16)
AST SERPL-CCNC: 15 U/L (ref 15–37)
BILIRUB SERPL-MCNC: 0.7 MG/DL (ref 0–1)
BUN SERPL-MCNC: 11 MG/DL (ref 7–20)
CALCIUM SERPL-MCNC: 9.3 MG/DL (ref 8.3–10.6)
CHLORIDE SERPL-SCNC: 102 MMOL/L (ref 99–110)
CHOLEST SERPL-MCNC: 152 MG/DL (ref 0–199)
CO2 SERPL-SCNC: 24 MMOL/L (ref 21–32)
CREAT SERPL-MCNC: 0.7 MG/DL (ref 0.6–1.1)
DEPRECATED RDW RBC AUTO: 14.1 % (ref 12.4–15.4)
FOLATE SERPL-MCNC: 12.9 NG/ML (ref 4.78–24.2)
GFR SERPLBLD CREATININE-BSD FMLA CKD-EPI: >60 ML/MIN/{1.73_M2}
GLUCOSE SERPL-MCNC: 92 MG/DL (ref 70–99)
HCT VFR BLD AUTO: 41.6 % (ref 36–48)
HDLC SERPL-MCNC: 59 MG/DL (ref 40–60)
HGB BLD-MCNC: 13.9 G/DL (ref 12–16)
LDLC SERPL CALC-MCNC: 84 MG/DL
MCH RBC QN AUTO: 28.5 PG (ref 26–34)
MCHC RBC AUTO-ENTMCNC: 33.5 G/DL (ref 31–36)
MCV RBC AUTO: 85.1 FL (ref 80–100)
PLATELET # BLD AUTO: 242 K/UL (ref 135–450)
PMV BLD AUTO: 9.6 FL (ref 5–10.5)
POTASSIUM SERPL-SCNC: 3.9 MMOL/L (ref 3.5–5.1)
PROT SERPL-MCNC: 7.1 G/DL (ref 6.4–8.2)
RBC # BLD AUTO: 4.88 M/UL (ref 4–5.2)
SODIUM SERPL-SCNC: 135 MMOL/L (ref 136–145)
TRIGL SERPL-MCNC: 46 MG/DL (ref 0–150)
TSH SERPL DL<=0.005 MIU/L-ACNC: 1.15 UIU/ML (ref 0.27–4.2)
VIT B12 SERPL-MCNC: 327 PG/ML (ref 211–911)
VLDLC SERPL CALC-MCNC: 9 MG/DL
WBC # BLD AUTO: 6 K/UL (ref 4–11)

## 2023-11-09 PROCEDURE — 83036 HEMOGLOBIN GLYCOSYLATED A1C: CPT

## 2023-11-09 PROCEDURE — 82306 VITAMIN D 25 HYDROXY: CPT

## 2023-11-09 PROCEDURE — 80061 LIPID PANEL: CPT

## 2023-11-09 PROCEDURE — 82746 ASSAY OF FOLIC ACID SERUM: CPT

## 2023-11-09 PROCEDURE — 84443 ASSAY THYROID STIM HORMONE: CPT

## 2023-11-09 PROCEDURE — 85027 COMPLETE CBC AUTOMATED: CPT

## 2023-11-09 PROCEDURE — 80053 COMPREHEN METABOLIC PANEL: CPT

## 2023-11-09 PROCEDURE — 36415 COLL VENOUS BLD VENIPUNCTURE: CPT

## 2023-11-09 PROCEDURE — 82607 VITAMIN B-12: CPT

## 2023-11-10 LAB
EST. AVERAGE GLUCOSE BLD GHB EST-MCNC: 111.2 MG/DL
HBA1C MFR BLD: 5.5 %

## 2023-11-16 ENCOUNTER — TELEMEDICINE (OUTPATIENT)
Dept: BARIATRICS/WEIGHT MGMT | Age: 23
End: 2023-11-16
Payer: COMMERCIAL

## 2023-11-16 VITALS — BODY MASS INDEX: 37.99 KG/M2 | WEIGHT: 214.4 LBS | HEIGHT: 63 IN

## 2023-11-16 DIAGNOSIS — E55.9 VITAMIN D INSUFFICIENCY: ICD-10-CM

## 2023-11-16 DIAGNOSIS — E66.9 CLASS 2 OBESITY: Primary | ICD-10-CM

## 2023-11-16 DIAGNOSIS — Z71.3 DIETARY COUNSELING AND SURVEILLANCE: ICD-10-CM

## 2023-11-16 PROCEDURE — 99214 OFFICE O/P EST MOD 30 MIN: CPT | Performed by: FAMILY MEDICINE

## 2023-11-16 RX ORDER — NALTREXONE HYDROCHLORIDE AND BUPROPION HYDROCHLORIDE 8; 90 MG/1; MG/1
TABLET, EXTENDED RELEASE ORAL
Qty: 120 TABLET | Refills: 0 | Status: SHIPPED | OUTPATIENT
Start: 2023-11-16

## 2023-11-16 ASSESSMENT — ENCOUNTER SYMPTOMS
CHEST TIGHTNESS: 0
CONSTIPATION: 0
WHEEZING: 0
PHOTOPHOBIA: 0
NAUSEA: 0
BLOOD IN STOOL: 0
DIARRHEA: 0
SHORTNESS OF BREATH: 0
CHOKING: 0
COUGH: 0
ABDOMINAL DISTENTION: 0
EYE PAIN: 0
VOMITING: 0
ABDOMINAL PAIN: 0
APNEA: 0

## 2023-11-16 NOTE — PROGRESS NOTES
soda/juice/sugary drinks. Be mindful of portion sizes. 3. Vitamin D insufficiency  Start Vit D3 2,000 IU daily. Nutrition Plan: [] LCHF/Ketogenic   [x] Modified low-calorie diet (low carb/low-majo)               [] Low-calorie diet    [] Maintenance       []Other        Exercise: [x] Cardio     [x] Resistance/strength training                       [x] ACSM recommendations (150 minutes/week in active weight loss)               Behavior: [x] Motivational interviewing performed    [] Referral for counseling                         [x] Discussed strategies to overcome habits/challenges for focus         [] Stress management   [x] Stimulus control         [] Sleep hygiene      Reviewed:  [x] Nutrition and the importance of regular protein intake  [x] Hidden carbohydrate sources  [x] Alcohol use  [x] Tobacco use   [x] Drug use- Denies  [x] Importance of exercise and reducing sedentary time  [x] Treatment consent- Patient understands and agrees with the treatment plan   [x] Proper use of medication and side effects      Controlled Substance Monitoring:         No data to display                   Treatment start date: 11/22/23  12 weeks from start of therapeutic dose: 3/22/23  Starting weight: 214 pounds   Goal: At least 5% (10.5 pounds)         Key dietary points:    - Meats (preferably organic or grass fed) are great sources of protein and have no carbohydrates. - Recommend coconut, olive, avocado, or almond oils. - When buying dairy, choose regular or full fat options. - Choose vegetables that grow above ground as they are generally lower in carbohydrates and higher in fiber.  - Avoid starches such as bread, rice, potatoes, pasta and all sources of simple sugars (desserts, soda, breakfast cereals). - Choose beverages that are calorie and sugar free. Reminder regarding weight loss medications: You must be seen in office every 2-4 weeks to haveyour prescriptions refilled.    If you are off of your

## 2023-12-13 ENCOUNTER — TELEMEDICINE (OUTPATIENT)
Dept: BARIATRICS/WEIGHT MGMT | Age: 23
End: 2023-12-13
Payer: COMMERCIAL

## 2023-12-13 DIAGNOSIS — Z71.3 DIETARY COUNSELING AND SURVEILLANCE: ICD-10-CM

## 2023-12-13 DIAGNOSIS — E66.9 CLASS 2 OBESITY: Primary | ICD-10-CM

## 2023-12-13 PROCEDURE — 99214 OFFICE O/P EST MOD 30 MIN: CPT | Performed by: FAMILY MEDICINE

## 2023-12-13 RX ORDER — NALTREXONE HYDROCHLORIDE AND BUPROPION HYDROCHLORIDE 8; 90 MG/1; MG/1
2 TABLET, EXTENDED RELEASE ORAL 2 TIMES DAILY
Qty: 120 TABLET | Refills: 0 | Status: SHIPPED | OUTPATIENT
Start: 2023-12-13

## 2023-12-13 ASSESSMENT — ENCOUNTER SYMPTOMS
VOMITING: 0
SHORTNESS OF BREATH: 0
COUGH: 0
CHEST TIGHTNESS: 0
BLOOD IN STOOL: 0
DIARRHEA: 0
CONSTIPATION: 0
ABDOMINAL DISTENTION: 0
EYE PAIN: 0
PHOTOPHOBIA: 0
NAUSEA: 0
APNEA: 0
CHOKING: 0
ABDOMINAL PAIN: 0
WHEEZING: 0

## 2023-12-13 NOTE — PROGRESS NOTES
for use in patients  <25years of age. eGFR results are calculated without  a race factor using the 2021 CKD-EPI equation. Careful  clinical correlation is recommended, particularly when  comparing to results calculated using previous equations. The CKD-EPI equation is less accurate in patients with  extremes of muscle mass, extra-renal metabolism of  creatinine, excessive creatinine ingestion, or following  therapy that affects renal tubular secretion. Calcium 11/09/2023 9.3  8.3 - 10.6 mg/dL Final    Total Protein 11/09/2023 7.1  6.4 - 8.2 g/dL Final    Albumin 11/09/2023 4.6  3.4 - 5.0 g/dL Final    Albumin/Globulin Ratio 11/09/2023 1.8  1.1 - 2.2 Final    Total Bilirubin 11/09/2023 0.7  0.0 - 1.0 mg/dL Final    Alkaline Phosphatase 11/09/2023 46  40 - 129 U/L Final    ALT 11/09/2023 <5 (L)  10 - 40 U/L Final    AST 11/09/2023 15  15 - 37 U/L Final         Assessment and Plan:  1. Class 2 obesity  Improving, not at goal.  Increase Contrave as prescribed. Refill provided. Continue low carb/majo diet. F/u 4 weeks. 2. Dietary counseling and surveillance  Low carb/majo meal plan as prescribed.           Nutrition Plan: [] LCHF/Ketogenic   [x] Modified low-calorie diet (low carb/low-majo)               [] Low-calorie diet    [] Maintenance       []Other        Exercise: [x] Cardio     [x] Resistance/strength training                       [x] ACSM recommendations (150 minutes/week in active weight loss)               Behavior: [x] Motivational interviewing performed    [] Referral for counseling                         [x] Discussed strategies to overcome habits/challenges for focus         [] Stress management   [x] Stimulus control         [] Sleep hygiene      Reviewed:  [x] Nutrition and the importance of regular protein intake  [x] Hidden carbohydrate sources  [x] Alcohol use  [x] Tobacco use   [x] Drug use- Denies  [x] Importance of exercise and reducing sedentary time  [x] Treatment consent-

## 2024-01-23 ENCOUNTER — TELEMEDICINE (OUTPATIENT)
Dept: BARIATRICS/WEIGHT MGMT | Age: 24
End: 2024-01-23
Payer: COMMERCIAL

## 2024-01-23 DIAGNOSIS — Z71.3 DIETARY COUNSELING AND SURVEILLANCE: ICD-10-CM

## 2024-01-23 DIAGNOSIS — E66.9 CLASS 2 OBESITY: Primary | ICD-10-CM

## 2024-01-23 PROCEDURE — 99214 OFFICE O/P EST MOD 30 MIN: CPT | Performed by: FAMILY MEDICINE

## 2024-01-23 RX ORDER — NALTREXONE HYDROCHLORIDE AND BUPROPION HYDROCHLORIDE 8; 90 MG/1; MG/1
2 TABLET, EXTENDED RELEASE ORAL 2 TIMES DAILY
Qty: 120 TABLET | Refills: 0 | Status: SHIPPED | OUTPATIENT
Start: 2024-01-23

## 2024-01-23 ASSESSMENT — ENCOUNTER SYMPTOMS
PHOTOPHOBIA: 0
CHOKING: 0
ABDOMINAL PAIN: 0
BLOOD IN STOOL: 0
APNEA: 0
ABDOMINAL DISTENTION: 0
WHEEZING: 0
DIARRHEA: 0
CONSTIPATION: 0
SHORTNESS OF BREATH: 0
VOMITING: 0
NAUSEA: 0
COUGH: 0
CHEST TIGHTNESS: 0

## 2024-01-23 NOTE — PROGRESS NOTES
Patient: Eloisa Harding                      Encounter Date: 1/23/2024    YOB: 2000                Age: 23 y.o.    Chief Complaint   Patient presents with    Weight Management     F/u MWM         Patient identification was verified at the start of the visit.         1/23/2024    12:55 PM   Patient-Reported Vitals   Patient-Reported Weight 208.2   Patient-Reported Height 5 ‘ 4”         BP Readings from Last 1 Encounters:   09/29/23 112/64       BMI Readings from Last 1 Encounters:   11/16/23 37.98 kg/m²       Pulse Readings from Last 1 Encounters:   09/29/23 84          HPI: 23 y.o. female with a long-standing history of obesity presents today for virtual video follow-up. She has lost a pound since her last visit. Current treatment includes Contrave and low carb/majo diet. She is on 4 pills/day. No issues. Got off track with diet during the holidays. Ready to refocus.      Medication(s): Appetite well controlled?     [x]Yes      []No                          Focus:     [x]Good     []Fair     []Poor                          Side effects? No        Any recent change in medication(s)? No      Exercise: []Cardio     []Resistance/strength training     [x]Other: Physically active        Allergies   Allergen Reactions    Amoxicillin-Pot Clavulanate     Macrodantin [Nitrofurantoin]     Sulfa Antibiotics     Penicillins Hives, Itching and Rash         Current Outpatient Medications:     naltrexone-buPROPion (CONTRAVE) 8-90 MG per extended release tablet, Take 2 tablets by mouth 2 times daily, Disp: 120 tablet, Rfl: 0    albuterol sulfate HFA (VENTOLIN HFA) 108 (90 Base) MCG/ACT inhaler, Inhale 1-2 puffs into the lungs every 6 hours as needed for Wheezing, Disp: 18 g, Rfl: 0    vitamin D (ERGOCALCIFEROL) 1.25 MG (76869 UT) CAPS capsule, Take 1 capsule by mouth once a week, Disp: 8 capsule, Rfl: 0    Patient Active Problem List   Diagnosis    Morbid obesity with BMI of 40.0-44.9, adult ([facility])    Vitamin D deficiency

## 2024-01-25 LAB
C TRACH DNA CVX QL NAA+PROBE: NEGATIVE
N GONORRHOEA DNA SPEC QL NAA+PROBE: NEGATIVE

## 2024-02-14 ENCOUNTER — TELEPHONE (OUTPATIENT)
Dept: ADMINISTRATIVE | Age: 24
End: 2024-02-14

## 2024-02-14 NOTE — TELEPHONE ENCOUNTER
Submitted PA for Contrave 8-90MG er tablets  Via [facility]  (Key: [id number]) STATUS: PENDING.    Follow up done daily; if no decision with in three days we will refax.  If another three days goes by with no decision will call the insurance for status.

## 2024-03-02 ENCOUNTER — TELEMEDICINE (OUTPATIENT)
Dept: BARIATRICS/WEIGHT MGMT | Age: 24
End: 2024-03-02
Payer: COMMERCIAL

## 2024-03-02 DIAGNOSIS — E66.9 CLASS 2 OBESITY: Primary | ICD-10-CM

## 2024-03-02 DIAGNOSIS — Z71.3 DIETARY COUNSELING AND SURVEILLANCE: ICD-10-CM

## 2024-03-02 PROCEDURE — 99214 OFFICE O/P EST MOD 30 MIN: CPT | Performed by: FAMILY MEDICINE

## 2024-03-02 RX ORDER — NALTREXONE HYDROCHLORIDE AND BUPROPION HYDROCHLORIDE 8; 90 MG/1; MG/1
2 TABLET, EXTENDED RELEASE ORAL 2 TIMES DAILY
Qty: 120 TABLET | Refills: 0 | Status: SHIPPED | OUTPATIENT
Start: 2024-03-02

## 2024-03-02 ASSESSMENT — ENCOUNTER SYMPTOMS
EYE PAIN: 0
VOMITING: 0
PHOTOPHOBIA: 0
CHOKING: 0
WHEEZING: 0
CHEST TIGHTNESS: 0
ABDOMINAL DISTENTION: 0
NAUSEA: 0
APNEA: 0
CONSTIPATION: 0
ABDOMINAL PAIN: 0
DIARRHEA: 0
SHORTNESS OF BREATH: 0
COUGH: 0
BLOOD IN STOOL: 0

## 2024-03-02 NOTE — PROGRESS NOTES
Patient: Eloisa Harding                      Encounter Date: 3/2/2024    YOB: 2000                Age: 23 y.o.    Chief Complaint   Patient presents with    Weight Management     F/u MWM         Patient identification was verified at the start of the visit.         3/1/2024    12:11 PM   Patient-Reported Vitals   Patient-Reported Weight 204   Patient-Reported Height 5’4”         BP Readings from Last 1 Encounters:   09/29/23 112/64       BMI Readings from Last 1 Encounters:   11/16/23 37.98 kg/m²       Pulse Readings from Last 1 Encounters:   09/29/23 84          HPI: 23 y.o. female with a long-standing history of obesity presents today for virtual video follow-up. She has lost 4 pounds since her last visit. Current treatment includes Contrave and low carb/majo diet. No issues.    Clothes fitting better    Started dance class- twice a week     Medication(s): Appetite well controlled?     [x]Yes      []No                          Focus:     [x]Good     []Fair     []Poor                          Side effects? No        Any recent change in medication(s)? No      Exercise: []Cardio     []Resistance/strength training     [x]Other: Physically active        Allergies   Allergen Reactions    Amoxicillin-Pot Clavulanate     Macrodantin [Nitrofurantoin]     Sulfa Antibiotics     Penicillins Hives, Itching and Rash         Current Outpatient Medications:     naltrexone-buPROPion (CONTRAVE) 8-90 MG per extended release tablet, Take 2 tablets by mouth 2 times daily, Disp: 120 tablet, Rfl: 0    albuterol sulfate HFA (VENTOLIN HFA) 108 (90 Base) MCG/ACT inhaler, Inhale 1-2 puffs into the lungs every 6 hours as needed for Wheezing, Disp: 18 g, Rfl: 0    vitamin D (ERGOCALCIFEROL) 1.25 MG (54559 UT) CAPS capsule, Take 1 capsule by mouth once a week, Disp: 8 capsule, Rfl: 0    Patient Active Problem List   Diagnosis    Morbid obesity with BMI of 40.0-44.9, adult (HCC)    Vitamin D deficiency       Review of Systems

## 2024-04-24 ENCOUNTER — TELEPHONE (OUTPATIENT)
Dept: BARIATRICS/WEIGHT MGMT | Age: 24
End: 2024-04-24

## 2024-04-24 NOTE — TELEPHONE ENCOUNTER
[place] called [place] and called for an appointment today advised may 22nd told her we would call if one sooner. Asked about medication advised she would get a call letting her know what to do.

## 2024-04-24 NOTE — TELEPHONE ENCOUNTER
Left voicemail and sent [employer] message to obtain sooner appt.   In office weigh in is required prior

## 2024-04-25 VITALS — HEIGHT: 63 IN | BODY MASS INDEX: 37.32 KG/M2 | WEIGHT: 210.6 LBS

## 2024-04-26 ENCOUNTER — TELEMEDICINE (OUTPATIENT)
Dept: BARIATRICS/WEIGHT MGMT | Age: 24
End: 2024-04-26
Payer: COMMERCIAL

## 2024-04-26 DIAGNOSIS — Z71.3 DIETARY COUNSELING AND SURVEILLANCE: ICD-10-CM

## 2024-04-26 DIAGNOSIS — E66.9 CLASS 2 OBESITY: Primary | ICD-10-CM

## 2024-04-26 PROCEDURE — 99213 OFFICE O/P EST LOW 20 MIN: CPT | Performed by: FAMILY MEDICINE

## 2024-04-26 ASSESSMENT — ENCOUNTER SYMPTOMS
DIARRHEA: 0
SHORTNESS OF BREATH: 0
CHEST TIGHTNESS: 0
WHEEZING: 0
COUGH: 0
CONSTIPATION: 0
ABDOMINAL DISTENTION: 0
CHOKING: 0
NAUSEA: 0
APNEA: 0
EYE PAIN: 0
ABDOMINAL PAIN: 0
VOMITING: 0
PHOTOPHOBIA: 0
BLOOD IN STOOL: 0

## 2024-04-26 NOTE — PROGRESS NOTES
Patient: Eloisa Harding                      Encounter Date: 4/26/2024    YOB: 2000                Age: 23 y.o.    Chief Complaint   Patient presents with    Weight Management     F/u MWM         Patient identification was verified at the start of the visit.         3/1/2024    12:11 PM   Patient-Reported Vitals   Patient-Reported Weight 204   Patient-Reported Height 5’4”         BP Readings from Last 1 Encounters:   09/29/23 112/64       BMI Readings from Last 1 Encounters:   04/25/24 37.31 kg/m²       Pulse Readings from Last 1 Encounters:   09/29/23 84     Wt Readings from Last 3 Encounters:   04/25/24 95.5 kg (210 lb 9.6 oz)   11/16/23 97.3 kg (214 lb 6.4 oz)   09/29/23 98.4 kg (217 lb)        HPI: 23 y.o. female with a long-standing history of obesity presents today for virtual video follow-up. She has lost 4 pounds since November. Current tx includes Contrave. Got off track with diet while on vacation. Just returned from a trip to [facility].      Medication(s): Appetite well controlled?     Fair                           Focus:     []Good     [x]Fair     []Poor                          Side effects? No        Any recent change in medication(s)? No      Exercise: []Cardio     []Resistance/strength training     [x]Other: Physically active     Allergies   Allergen Reactions    Amoxicillin-Pot Clavulanate     Macrodantin [Nitrofurantoin]     Sulfa Antibiotics     Penicillins Hives, Itching and Rash         Current Outpatient Medications:     naltrexone-buPROPion (CONTRAVE) 8-90 MG per extended release tablet, Take 2 tablets by mouth 2 times daily, Disp: 120 tablet, Rfl: 0    albuterol sulfate HFA (VENTOLIN HFA) 108 (90 Base) MCG/ACT inhaler, Inhale 1-2 puffs into the lungs every 6 hours as needed for Wheezing, Disp: 18 g, Rfl: 0    vitamin D (ERGOCALCIFEROL) 1.25 MG (43993 UT) CAPS capsule, Take 1 capsule by mouth once a week, Disp: 8 capsule, Rfl: 0    Patient Active Problem List   Diagnosis    Morbid

## 2024-05-02 DIAGNOSIS — E55.9 VITAMIN D DEFICIENCY: ICD-10-CM

## 2024-05-02 DIAGNOSIS — E66.01 MORBID OBESITY WITH BMI OF 40.0-44.9, ADULT (HCC): Primary | ICD-10-CM

## 2024-05-02 DIAGNOSIS — Z71.3 DIETARY COUNSELING AND SURVEILLANCE: ICD-10-CM

## 2024-05-07 DIAGNOSIS — E55.9 VITAMIN D DEFICIENCY: ICD-10-CM

## 2024-05-07 DIAGNOSIS — E66.01 MORBID OBESITY WITH BMI OF 40.0-44.9, ADULT (HCC): ICD-10-CM

## 2024-05-07 DIAGNOSIS — Z71.3 DIETARY COUNSELING AND SURVEILLANCE: ICD-10-CM

## 2024-05-07 LAB
25(OH)D3 SERPL-MCNC: 45 NG/ML
ALBUMIN SERPL-MCNC: 4.3 G/DL (ref 3.4–5)
ALBUMIN/GLOB SERPL: 1.8 {RATIO} (ref 1.1–2.2)
ALP SERPL-CCNC: 52 U/L (ref 40–129)
ALT SERPL-CCNC: 8 U/L (ref 10–40)
ANION GAP SERPL CALCULATED.3IONS-SCNC: 12 MMOL/L (ref 3–16)
AST SERPL-CCNC: 15 U/L (ref 15–37)
BILIRUB SERPL-MCNC: 0.5 MG/DL (ref 0–1)
BUN SERPL-MCNC: 10 MG/DL (ref 7–20)
CALCIUM SERPL-MCNC: 9.5 MG/DL (ref 8.3–10.6)
CHLORIDE SERPL-SCNC: 103 MMOL/L (ref 99–110)
CHOLEST SERPL-MCNC: 149 MG/DL (ref 0–199)
CO2 SERPL-SCNC: 25 MMOL/L (ref 21–32)
CREAT SERPL-MCNC: 0.8 MG/DL (ref 0.6–1.1)
DEPRECATED RDW RBC AUTO: 13.7 % (ref 12.4–15.4)
FOLATE SERPL-MCNC: 7.62 NG/ML (ref 4.78–24.2)
GFR SERPLBLD CREATININE-BSD FMLA CKD-EPI: >90 ML/MIN/{1.73_M2}
GLUCOSE SERPL-MCNC: 96 MG/DL (ref 70–99)
HCT VFR BLD AUTO: 38.8 % (ref 36–48)
HDLC SERPL-MCNC: 53 MG/DL (ref 40–60)
HGB BLD-MCNC: 13.1 G/DL (ref 12–16)
LDLC SERPL CALC-MCNC: 85 MG/DL
MCH RBC QN AUTO: 28.6 PG (ref 26–34)
MCHC RBC AUTO-ENTMCNC: 33.7 G/DL (ref 31–36)
MCV RBC AUTO: 84.8 FL (ref 80–100)
PLATELET # BLD AUTO: 268 K/UL (ref 135–450)
PMV BLD AUTO: 9 FL (ref 5–10.5)
POTASSIUM SERPL-SCNC: 4.1 MMOL/L (ref 3.5–5.1)
PROT SERPL-MCNC: 6.7 G/DL (ref 6.4–8.2)
RBC # BLD AUTO: 4.58 M/UL (ref 4–5.2)
SODIUM SERPL-SCNC: 140 MMOL/L (ref 136–145)
TRIGL SERPL-MCNC: 54 MG/DL (ref 0–150)
VIT B12 SERPL-MCNC: 338 PG/ML (ref 211–911)
VLDLC SERPL CALC-MCNC: 11 MG/DL
WBC # BLD AUTO: 4.1 K/UL (ref 4–11)

## 2024-05-08 LAB
EST. AVERAGE GLUCOSE BLD GHB EST-MCNC: 108.3 MG/DL
HBA1C MFR BLD: 5.4 %

## 2024-05-23 ENCOUNTER — TELEMEDICINE (OUTPATIENT)
Dept: BARIATRICS/WEIGHT MGMT | Age: 24
End: 2024-05-23
Payer: COMMERCIAL

## 2024-05-23 ENCOUNTER — TELEPHONE (OUTPATIENT)
Dept: BARIATRICS/WEIGHT MGMT | Age: 24
End: 2024-05-23

## 2024-05-23 DIAGNOSIS — E66.9 CLASS 2 OBESITY: Primary | ICD-10-CM

## 2024-05-23 DIAGNOSIS — Z71.3 DIETARY COUNSELING AND SURVEILLANCE: ICD-10-CM

## 2024-05-23 PROCEDURE — 99214 OFFICE O/P EST MOD 30 MIN: CPT | Performed by: FAMILY MEDICINE

## 2024-05-23 RX ORDER — TIRZEPATIDE 2.5 MG/.5ML
0.5 INJECTION, SOLUTION SUBCUTANEOUS
Qty: 2 ML | Refills: 0 | Status: SHIPPED | OUTPATIENT
Start: 2024-05-23

## 2024-05-23 ASSESSMENT — ENCOUNTER SYMPTOMS
PHOTOPHOBIA: 0
CHEST TIGHTNESS: 0
DIARRHEA: 0
BLOOD IN STOOL: 0
ABDOMINAL PAIN: 0
ABDOMINAL DISTENTION: 0
EYE PAIN: 0
SHORTNESS OF BREATH: 0
VOMITING: 0
WHEEZING: 0
NAUSEA: 0
CHOKING: 0
COUGH: 0
CONSTIPATION: 0
APNEA: 0

## 2024-05-23 NOTE — PROGRESS NOTES
Comment: Comment:  Diagnosis of Diabetes: > or = 6.5%  Increased risk of diabetes (Prediabetes): 5.7-6.4%  Glycemic Control: Nonpregnant Adults: <7.0%                    Pregnant: <6.0%        Estimated Avg Glucose 05/07/2024 108.3  mg/dL Final    Vit D, 25-Hydroxy 05/07/2024 45.0  >=30 ng/mL Final    Comment: <=20 ng/mL.............Deficient  21-29 ng/mL...........Insufficient  >=30 ng/mL..........Sufficient      Vitamin B-12 05/07/2024 338  211 - 911 pg/mL Final    Folate 05/07/2024 7.62  4.78 - 24.20 ng/mL Final    Comment: Effective 11-15-16 10:00am EST  Please note reference ranges have  changed for Folate.           Assessment and Plan:  1. Class 2 obesity  Discussed risks, benefits and alternatives of Zepbound. Patient meets BMI criteria, denies any personal or family history of medullary thyroid cancer, denies a history of multiple endocrine neoplasia syndrome type II, pancreatitis, alcoholism, high blood triglyceride levels, and is not pregnant or planning to become pregnant.      Counseled patient on proper use and potential side effects including nausea, vomiting, diarrhea, constipation, hypoglycemia, headache, decreased appetite, dyspepsia, fatigue, dizziness, abdominal pain, and increased lipase levels.    Patient advised to report any side effects or if she develops a mass in the neck, dysphagia, dyspnea, or persistent hoarseness.     Heavily counseled on the importance of therapeutic lifestyle changes through diet and exercise.      2. Dietary counseling and surveillance  General low carb/majo diet.   Avoid skipping meals.   Avoid evening/nighttime snacking.   Use distraction techniques to avoid boredom/stress eating.  Plan/prep meals ahead of time.   Avoid soda/juice/sugary drinks.   Be mindful of portion sizes.          Nutrition Plan: [] LCHF/Ketogenic   [x] Modified low-calorie diet (low carb/low-majo)               [] Low-calorie diet    [] Maintenance       []Other        Exercise: [x] Cardio

## 2024-05-29 ENCOUNTER — TELEPHONE (OUTPATIENT)
Dept: BARIATRICS/WEIGHT MGMT | Age: 24
End: 2024-05-29

## 2024-05-29 NOTE — TELEPHONE ENCOUNTER
Patient contacted the office requesting a pharmacy change. She was able to find a pharmacy with Zepbound 2.5mg in stock.     Prescription called in verbally to [facility] ([address] [place] [address]. [place] [address])   (ZEPBOUND) 2.5 MG/0.5ML. qty:2mL. 0 refills     Patient advised to contact the office once she has picked up Rx to schedule 4 week follow-up appointment with Dr. Evans

## 2024-06-20 ENCOUNTER — TELEMEDICINE (OUTPATIENT)
Dept: BARIATRICS/WEIGHT MGMT | Age: 24
End: 2024-06-20
Payer: COMMERCIAL

## 2024-06-20 DIAGNOSIS — E66.9 CLASS 2 OBESITY: Primary | ICD-10-CM

## 2024-06-20 DIAGNOSIS — Z71.3 DIETARY COUNSELING AND SURVEILLANCE: ICD-10-CM

## 2024-06-20 PROCEDURE — 99214 OFFICE O/P EST MOD 30 MIN: CPT | Performed by: FAMILY MEDICINE

## 2024-06-20 RX ORDER — TIRZEPATIDE 5 MG/.5ML
0.5 INJECTION, SOLUTION SUBCUTANEOUS
Qty: 2 ML | Refills: 0 | Status: SHIPPED | OUTPATIENT
Start: 2024-06-20

## 2024-06-20 ASSESSMENT — ENCOUNTER SYMPTOMS
ABDOMINAL DISTENTION: 0
BLOOD IN STOOL: 0
DIARRHEA: 0
PHOTOPHOBIA: 0
VOMITING: 0
NAUSEA: 0
WHEEZING: 0
APNEA: 0
ABDOMINAL PAIN: 0
CHOKING: 0
CONSTIPATION: 0
COUGH: 0
CHEST TIGHTNESS: 0
SHORTNESS OF BREATH: 0
EYE PAIN: 0

## 2024-06-20 NOTE — PROGRESS NOTES
encounter. The patient (or guardian if applicable) is aware that this is a billable service, which includes applicable co-pays. This Virtual Visit was conducted with patient's (and/or legal guardian's) consent. Patient identification was verified, and a caregiver was present when appropriate.   The patient was located at Home: [address]  [facility] [address]  Provider was located at Home (Appt Dept State): [place]  Confirm you are appropriately licensed, registered, or certified to deliver care in the state where the patient is located as indicated above. If you are not or unsure, please re-schedule the visit: Yes, I confirm.        Total time spent for this encounter: Not billed by time    --Sierra Evans MD on 6/20/2024 at 2:57 PM    An electronic signature was used to authenticate this note.

## 2024-07-25 ENCOUNTER — TELEMEDICINE (OUTPATIENT)
Dept: BARIATRICS/WEIGHT MGMT | Age: 24
End: 2024-07-25
Payer: COMMERCIAL

## 2024-07-25 DIAGNOSIS — E66.9 CLASS 2 OBESITY: Primary | ICD-10-CM

## 2024-07-25 DIAGNOSIS — Z71.3 DIETARY COUNSELING AND SURVEILLANCE: ICD-10-CM

## 2024-07-25 PROCEDURE — 99214 OFFICE O/P EST MOD 30 MIN: CPT | Performed by: FAMILY MEDICINE

## 2024-07-25 RX ORDER — TIRZEPATIDE 7.5 MG/.5ML
0.5 INJECTION, SOLUTION SUBCUTANEOUS
Qty: 2 ML | Refills: 0 | Status: SHIPPED | OUTPATIENT
Start: 2024-07-25

## 2024-07-25 ASSESSMENT — ENCOUNTER SYMPTOMS
CHOKING: 0
WHEEZING: 0
BLOOD IN STOOL: 0
CHEST TIGHTNESS: 0
VOMITING: 0
DIARRHEA: 0
ABDOMINAL PAIN: 0
COUGH: 0
SHORTNESS OF BREATH: 0
NAUSEA: 0
PHOTOPHOBIA: 0
APNEA: 0
CONSTIPATION: 0
EYE PAIN: 0
ABDOMINAL DISTENTION: 0

## 2024-07-25 NOTE — PROGRESS NOTES
at Other: [place]   Provider was located at Home (Appt Dept State): [place]  Confirm you are appropriately licensed, registered, or certified to deliver care in the state where the patient is located as indicated above. If you are not or unsure, please re-schedule the visit: Yes, I confirm.        Total time spent for this encounter: Not billed by time    --Sierra Evans MD on 7/25/2024 at 2:00 PM    An electronic signature was used to authenticate this note.

## 2024-08-01 LAB
CHOLEST SERPL-MCNC: 144 MG/DL (ref 0–199)
GLUCOSE SERPL-MCNC: 94 MG/DL (ref 70–99)
HDLC SERPL-MCNC: 55 MG/DL (ref 40–60)
LDLC SERPL CALC-MCNC: 81 MG/DL
TRIGL SERPL-MCNC: 41 MG/DL (ref 0–150)

## 2024-08-23 ENCOUNTER — TELEMEDICINE (OUTPATIENT)
Dept: BARIATRICS/WEIGHT MGMT | Age: 24
End: 2024-08-23
Payer: COMMERCIAL

## 2024-08-23 DIAGNOSIS — Z71.3 DIETARY COUNSELING AND SURVEILLANCE: ICD-10-CM

## 2024-08-23 DIAGNOSIS — E66.9 CLASS 2 OBESITY: Primary | ICD-10-CM

## 2024-08-23 PROCEDURE — 99214 OFFICE O/P EST MOD 30 MIN: CPT | Performed by: FAMILY MEDICINE

## 2024-08-23 RX ORDER — TIRZEPATIDE 10 MG/.5ML
0.5 INJECTION, SOLUTION SUBCUTANEOUS WEEKLY
Qty: 2 ML | Refills: 0 | Status: SHIPPED | OUTPATIENT
Start: 2024-08-23

## 2024-08-23 ASSESSMENT — ENCOUNTER SYMPTOMS
NAUSEA: 0
ABDOMINAL DISTENTION: 0
PHOTOPHOBIA: 0
COUGH: 0
ABDOMINAL PAIN: 0
VOMITING: 0
DIARRHEA: 0
WHEEZING: 0
CHOKING: 0
CHEST TIGHTNESS: 0
APNEA: 0
BLOOD IN STOOL: 0
CONSTIPATION: 0
SHORTNESS OF BREATH: 0
EYE PAIN: 0

## 2024-08-23 NOTE — PROGRESS NOTES
sources of simple sugars (desserts, soda, breakfast cereals).  - Choose beverages that are calorie and sugar free.    Reminder regarding weight loss medications:    You must be seen in office every 2-4 weeks to haveyour prescriptions refilled.   If you are off of your medication for longer than 7 days, you will not be able to restart the medication for at least 6 months. Always call our office to report any side effects.    Females, it is your responsibility to obtain negative pregnancy tests each month.    No orders of the defined types were placed in this encounter.      No follow-ups on file.    Eloisa Harding is a 24 y.o. female being evaluated by a Virtual Visit (video visit) encounter to address concerns as mentioned above.  A caregiver was present when appropriate. Due to this being a TeleHealth encounter evaluation of the following organ systems was limited: Vitals/Constitutional/EENT/Resp/CV/GI//MS/Neuro/Skin/Heme-Lymph-Imm.      Eloisa Harding, was evaluated through a synchronous (real-time) audio-video encounter. The patient (or guardian if applicable) is aware that this is a billable service, which includes applicable co-pays. This Virtual Visit was conducted with patient's (and/or legal guardian's) consent. Patient identification was verified, and a caregiver was present when appropriate.   The patient was located at Other: [place]  Provider was located at Home (Appt Dept State): [place]  Confirm you are appropriately licensed, registered, or certified to deliver care in the state where the patient is located as indicated above. If you are not or unsure, please re-schedule the visit: Yes, I confirm.        Total time spent for this encounter: Not billed by time    --Sierra Evans MD on 8/23/2024 at 12:42 PM    An electronic signature was used to authenticate this note.

## 2024-09-20 ENCOUNTER — TELEMEDICINE (OUTPATIENT)
Dept: BARIATRICS/WEIGHT MGMT | Age: 24
End: 2024-09-20
Payer: COMMERCIAL

## 2024-09-20 VITALS — BODY MASS INDEX: 33.95 KG/M2 | WEIGHT: 191.6 LBS | HEIGHT: 63 IN

## 2024-09-20 DIAGNOSIS — E66.811 CLASS 1 OBESITY: Primary | ICD-10-CM

## 2024-09-20 DIAGNOSIS — Z71.3 DIETARY COUNSELING AND SURVEILLANCE: ICD-10-CM

## 2024-09-20 PROCEDURE — 99214 OFFICE O/P EST MOD 30 MIN: CPT | Performed by: FAMILY MEDICINE

## 2024-09-20 PROCEDURE — G2211 COMPLEX E/M VISIT ADD ON: HCPCS | Performed by: FAMILY MEDICINE

## 2024-09-20 RX ORDER — TIRZEPATIDE 12.5 MG/.5ML
0.5 INJECTION, SOLUTION SUBCUTANEOUS WEEKLY
Qty: 2 ML | Refills: 0 | Status: SHIPPED | OUTPATIENT
Start: 2024-09-20 | End: 2024-10-16

## 2024-09-20 NOTE — PROGRESS NOTES
Patient: Eloisa Harding                      Encounter Date: 9/20/2024    YOB: 2000                Age: 24 y.o.    Chief Complaint   Patient presents with    Weight Management     F/u MWM         Patient identification was verified at the start of the visit.         10/15/2024     8:40 PM   Patient-Reported Vitals   Patient-Reported Weight 185lbs   Patient-Reported Height 5’4”         BP Readings from Last 1 Encounters:   09/24/24 116/72       BMI Readings from Last 1 Encounters:   09/24/24 34.40 kg/m²       Pulse Readings from Last 1 Encounters:   09/24/24 83          Wt Readings from Last 3 Encounters:   09/24/24 88.1 kg (194 lb 3.2 oz)   09/20/24 86.9 kg (191 lb 9.6 oz)   04/25/24 95.5 kg (210 lb 9.6 oz)        HPI: 24 y.o. female with a long-standing history of obesity presents today for virtual video follow-up. She has lost 2 pounds since her last visit. Current treatment includes Zepbound 10 mg SC weekly.  Making good dietary choices. Staying physically active. No side effects.      Personal goal: ~175 pounds      Medication(s): Appetite well controlled?     [x]Yes      []No                          Focus:     [x]Good     []Fair     []Poor                          Side effects? No         Any recent change in medication(s)? No       Allergies   Allergen Reactions    Amoxicillin-Pot Clavulanate     Macrodantin [Nitrofurantoin]     Sulfa Antibiotics     Penicillins Hives, Itching and Rash         Current Outpatient Medications:     Tirzepatide-Weight Management (ZEPBOUND) 15 MG/0.5ML SOAJ, Inject 0.5 mLs into the skin once a week, Disp: 2 mL, Rfl: 0    albuterol sulfate HFA (VENTOLIN HFA) 108 (90 Base) MCG/ACT inhaler, Inhale 1-2 puffs into the lungs every 6 hours as needed for Wheezing, Disp: 18 g, Rfl: 0    vitamin D (ERGOCALCIFEROL) 1.25 MG (14620 UT) CAPS capsule, Take 1 capsule by mouth once a week, Disp: 8 capsule, Rfl: 0    Patient Active Problem List   Diagnosis    Morbid obesity with BMI of

## 2024-09-24 ENCOUNTER — OFFICE VISIT (OUTPATIENT)
Dept: INTERNAL MEDICINE CLINIC | Age: 24
End: 2024-09-24
Payer: COMMERCIAL

## 2024-09-24 VITALS
TEMPERATURE: 97.3 F | HEART RATE: 83 BPM | SYSTOLIC BLOOD PRESSURE: 116 MMHG | WEIGHT: 194.2 LBS | BODY MASS INDEX: 34.4 KG/M2 | OXYGEN SATURATION: 98 % | DIASTOLIC BLOOD PRESSURE: 72 MMHG

## 2024-09-24 DIAGNOSIS — N92.0 MENORRHAGIA WITH REGULAR CYCLE: ICD-10-CM

## 2024-09-24 DIAGNOSIS — E66.09 CLASS 1 OBESITY DUE TO EXCESS CALORIES WITHOUT SERIOUS COMORBIDITY WITH BODY MASS INDEX (BMI) OF 34.0 TO 34.9 IN ADULT: ICD-10-CM

## 2024-09-24 DIAGNOSIS — Z71.85 VACCINE COUNSELING: ICD-10-CM

## 2024-09-24 DIAGNOSIS — J45.909 ASTHMA, UNSPECIFIED ASTHMA SEVERITY, UNSPECIFIED WHETHER COMPLICATED, UNSPECIFIED WHETHER PERSISTENT: ICD-10-CM

## 2024-09-24 DIAGNOSIS — Z00.00 ANNUAL PHYSICAL EXAM: Primary | ICD-10-CM

## 2024-09-24 DIAGNOSIS — E55.9 VITAMIN D DEFICIENCY: ICD-10-CM

## 2024-09-24 PROCEDURE — 99395 PREV VISIT EST AGE 18-39: CPT | Performed by: NURSE PRACTITIONER

## 2024-09-24 RX ORDER — ALBUTEROL SULFATE 90 UG/1
1-2 INHALANT RESPIRATORY (INHALATION) EVERY 6 HOURS PRN
Qty: 18 G | Refills: 0 | Status: SHIPPED | OUTPATIENT
Start: 2024-09-24

## 2024-09-24 ASSESSMENT — ENCOUNTER SYMPTOMS
ABDOMINAL PAIN: 0
COUGH: 0
SHORTNESS OF BREATH: 0
CONSTIPATION: 0
DIARRHEA: 0
NAUSEA: 0
WHEEZING: 0
VOMITING: 0

## 2024-09-24 ASSESSMENT — PATIENT HEALTH QUESTIONNAIRE - PHQ9
SUM OF ALL RESPONSES TO PHQ9 QUESTIONS 1 & 2: 0
SUM OF ALL RESPONSES TO PHQ QUESTIONS 1-9: 0
1. LITTLE INTEREST OR PLEASURE IN DOING THINGS: NOT AT ALL
SUM OF ALL RESPONSES TO PHQ QUESTIONS 1-9: 0
2. FEELING DOWN, DEPRESSED OR HOPELESS: NOT AT ALL

## 2024-10-16 ENCOUNTER — TELEMEDICINE (OUTPATIENT)
Dept: BARIATRICS/WEIGHT MGMT | Age: 24
End: 2024-10-16
Payer: COMMERCIAL

## 2024-10-16 DIAGNOSIS — E66.811 CLASS 1 OBESITY: Primary | ICD-10-CM

## 2024-10-16 DIAGNOSIS — Z71.3 DIETARY COUNSELING AND SURVEILLANCE: ICD-10-CM

## 2024-10-16 PROCEDURE — 99214 OFFICE O/P EST MOD 30 MIN: CPT | Performed by: FAMILY MEDICINE

## 2024-10-16 RX ORDER — TIRZEPATIDE 15 MG/.5ML
0.5 INJECTION, SOLUTION SUBCUTANEOUS WEEKLY
Qty: 2 ML | Refills: 0 | Status: SHIPPED | OUTPATIENT
Start: 2024-10-16

## 2024-10-16 NOTE — PROGRESS NOTES
Patient: Eloisa Harding                      Encounter Date: 10/16/2024    YOB: 2000                Age: 24 y.o.    Chief Complaint   Patient presents with    Weight Management     F/u MWM         Patient identification was verified at the start of the visit.         10/15/2024     8:40 PM   Patient-Reported Vitals   Patient-Reported Weight 185lbs   Patient-Reported Height 5’4”         BP Readings from Last 1 Encounters:   09/24/24 116/72       BMI Readings from Last 1 Encounters:   09/24/24 34.40 kg/m²       Pulse Readings from Last 1 Encounters:   09/24/24 83          Wt Readings from Last 3 Encounters:   09/24/24 88.1 kg (194 lb 3.2 oz)   09/20/24 86.9 kg (191 lb 9.6 oz)   04/25/24 95.5 kg (210 lb 9.6 oz)        HPI: 24 y.o. female with a long-standing history of obesity presents today for virtual video follow-up. She has lost 6 pounds since her last visit.  Current treatment includes Zepbound 12.5 mg SC weekly. Mindful of food choices and portion sizes.      Personal goal: ~175 pounds      Medication(s): Appetite well controlled?     [x]Yes      []No                          Focus:     [x]Good     []Fair     []Poor                          Side effects? No         Any recent change in medication(s)? No          Allergies   Allergen Reactions    Amoxicillin-Pot Clavulanate     Macrodantin [Nitrofurantoin]     Sulfa Antibiotics     Penicillins Hives, Itching and Rash         Current Outpatient Medications:     Tirzepatide-Weight Management (ZEPBOUND) 15 MG/0.5ML SOAJ, Inject 0.5 mLs into the skin once a week, Disp: 2 mL, Rfl: 0    albuterol sulfate HFA (VENTOLIN HFA) 108 (90 Base) MCG/ACT inhaler, Inhale 1-2 puffs into the lungs every 6 hours as needed for Wheezing, Disp: 18 g, Rfl: 0    vitamin D (ERGOCALCIFEROL) 1.25 MG (26921 UT) CAPS capsule, Take 1 capsule by mouth once a week, Disp: 8 capsule, Rfl: 0    Patient Active Problem List   Diagnosis    Morbid obesity with BMI of 40.0-44.9, adult

## 2024-11-18 ENCOUNTER — APPOINTMENT (OUTPATIENT)
Dept: ULTRASOUND IMAGING | Age: 24
End: 2024-11-18
Payer: COMMERCIAL

## 2024-11-18 ENCOUNTER — HOSPITAL ENCOUNTER (EMERGENCY)
Age: 24
Discharge: HOME | End: 2024-11-19
Payer: COMMERCIAL

## 2024-11-18 DIAGNOSIS — R10.31 RIGHT LOWER QUADRANT ABDOMINAL PAIN: Primary | ICD-10-CM

## 2024-11-18 LAB
ALBUMIN SERPL-MCNC: 4.4 G/DL (ref 3.4–5)
ALBUMIN/GLOB SERPL: 1.8 {RATIO} (ref 1.1–2.2)
ALP SERPL-CCNC: 42 U/L (ref 40–129)
ALT SERPL-CCNC: 6 U/L (ref 10–40)
ANION GAP SERPL CALCULATED.3IONS-SCNC: 12 MMOL/L (ref 3–16)
AST SERPL-CCNC: 20 U/L (ref 15–37)
BACTERIA URNS QL MICRO: NORMAL /HPF
BASOPHILS # BLD: 0 K/UL (ref 0–0.2)
BASOPHILS NFR BLD: 0.6 %
BILIRUB SERPL-MCNC: <0.2 MG/DL (ref 0–1)
BILIRUB UR QL STRIP.AUTO: NEGATIVE
BUN SERPL-MCNC: 14 MG/DL (ref 7–20)
CALCIUM SERPL-MCNC: 9.5 MG/DL (ref 8.3–10.6)
CHLORIDE SERPL-SCNC: 105 MMOL/L (ref 99–110)
CLARITY UR: CLEAR
CO2 SERPL-SCNC: 24 MMOL/L (ref 21–32)
COLOR UR: YELLOW
CREAT SERPL-MCNC: 0.8 MG/DL (ref 0.6–1.1)
DEPRECATED RDW RBC AUTO: 13.8 % (ref 12.4–15.4)
EOSINOPHIL # BLD: 0.1 K/UL (ref 0–0.6)
EOSINOPHIL NFR BLD: 1.7 %
EPI CELLS #/AREA URNS AUTO: 1 /HPF (ref 0–5)
GFR SERPLBLD CREATININE-BSD FMLA CKD-EPI: >90 ML/MIN/{1.73_M2}
GLUCOSE SERPL-MCNC: 94 MG/DL (ref 70–99)
GLUCOSE UR STRIP.AUTO-MCNC: NEGATIVE MG/DL
HCG SERPL QL: NEGATIVE
HCT VFR BLD AUTO: 40.7 % (ref 36–48)
HGB BLD-MCNC: 13.8 G/DL (ref 12–16)
HGB UR QL STRIP.AUTO: NEGATIVE
HYALINE CASTS #/AREA URNS AUTO: 0 /LPF (ref 0–8)
KETONES UR STRIP.AUTO-MCNC: ABNORMAL MG/DL
LEUKOCYTE ESTERASE UR QL STRIP.AUTO: NEGATIVE
LIPASE SERPL-CCNC: 45 U/L (ref 13–60)
LYMPHOCYTES # BLD: 1.7 K/UL (ref 1–5.1)
LYMPHOCYTES NFR BLD: 29.2 %
MCH RBC QN AUTO: 30.1 PG (ref 26–34)
MCHC RBC AUTO-ENTMCNC: 33.9 G/DL (ref 31–36)
MCV RBC AUTO: 88.9 FL (ref 80–100)
MONOCYTES # BLD: 0.5 K/UL (ref 0–1.3)
MONOCYTES NFR BLD: 9.1 %
NEUTROPHILS # BLD: 3.4 K/UL (ref 1.7–7.7)
NEUTROPHILS NFR BLD: 59.4 %
NITRITE UR QL STRIP.AUTO: NEGATIVE
PH UR STRIP.AUTO: 5.5 [PH] (ref 5–8)
PLATELET # BLD AUTO: 207 K/UL (ref 135–450)
PMV BLD AUTO: 9.1 FL (ref 5–10.5)
POTASSIUM SERPL-SCNC: 3.8 MMOL/L (ref 3.5–5.1)
PROT SERPL-MCNC: 6.8 G/DL (ref 6.4–8.2)
PROT UR STRIP.AUTO-MCNC: ABNORMAL MG/DL
RBC # BLD AUTO: 4.58 M/UL (ref 4–5.2)
RBC CLUMPS #/AREA URNS AUTO: 0 /HPF (ref 0–4)
SODIUM SERPL-SCNC: 141 MMOL/L (ref 136–145)
SP GR UR STRIP.AUTO: 1.03 (ref 1–1.03)
UA COMPLETE W REFLEX CULTURE PNL UR: ABNORMAL
UA DIPSTICK W REFLEX MICRO PNL UR: YES
URN SPEC COLLECT METH UR: ABNORMAL
UROBILINOGEN UR STRIP-ACNC: 1 E.U./DL
WBC # BLD AUTO: 5.7 K/UL (ref 4–11)
WBC #/AREA URNS AUTO: 1 /HPF (ref 0–5)

## 2024-11-18 PROCEDURE — 96374 THER/PROPH/DIAG INJ IV PUSH: CPT

## 2024-11-18 PROCEDURE — 85025 COMPLETE CBC W/AUTO DIFF WBC: CPT

## 2024-11-18 PROCEDURE — 84703 CHORIONIC GONADOTROPIN ASSAY: CPT

## 2024-11-18 PROCEDURE — 99284 EMERGENCY DEPT VISIT MOD MDM: CPT

## 2024-11-18 PROCEDURE — 6360000002 HC RX W HCPCS: Performed by: PHYSICIAN ASSISTANT

## 2024-11-18 PROCEDURE — 76830 TRANSVAGINAL US NON-OB: CPT

## 2024-11-18 PROCEDURE — 80053 COMPREHEN METABOLIC PANEL: CPT

## 2024-11-18 PROCEDURE — 93975 VASCULAR STUDY: CPT

## 2024-11-18 PROCEDURE — 81001 URINALYSIS AUTO W/SCOPE: CPT

## 2024-11-18 PROCEDURE — 83690 ASSAY OF LIPASE: CPT

## 2024-11-18 RX ORDER — KETOROLAC TROMETHAMINE 15 MG/ML
15 INJECTION, SOLUTION INTRAMUSCULAR; INTRAVENOUS ONCE
Status: COMPLETED | OUTPATIENT
Start: 2024-11-18 | End: 2024-11-18

## 2024-11-18 RX ADMIN — KETOROLAC TROMETHAMINE 15 MG: 15 INJECTION, SOLUTION INTRAMUSCULAR; INTRAVENOUS at 22:18

## 2024-11-18 ASSESSMENT — PAIN DESCRIPTION - ORIENTATION: ORIENTATION: RIGHT

## 2024-11-18 ASSESSMENT — PAIN SCALES - GENERAL
PAINLEVEL_OUTOF10: 6
PAINLEVEL_OUTOF10: 6

## 2024-11-18 ASSESSMENT — PAIN DESCRIPTION - DESCRIPTORS: DESCRIPTORS: ACHING;DISCOMFORT;POUNDING

## 2024-11-18 ASSESSMENT — PAIN - FUNCTIONAL ASSESSMENT: PAIN_FUNCTIONAL_ASSESSMENT: 0-10

## 2024-11-18 ASSESSMENT — PAIN DESCRIPTION - LOCATION: LOCATION: PELVIS

## 2024-11-19 VITALS
WEIGHT: 194.22 LBS | OXYGEN SATURATION: 98 % | HEART RATE: 86 BPM | TEMPERATURE: 98.1 F | DIASTOLIC BLOOD PRESSURE: 67 MMHG | HEIGHT: 64 IN | RESPIRATION RATE: 18 BRPM | SYSTOLIC BLOOD PRESSURE: 113 MMHG | BODY MASS INDEX: 33.16 KG/M2

## 2024-11-19 RX ORDER — LIDOCAINE 50 MG/G
1 PATCH TOPICAL DAILY
Qty: 10 PATCH | Refills: 0 | Status: SHIPPED | OUTPATIENT
Start: 2024-11-19 | End: 2024-11-29

## 2024-11-19 NOTE — DISCHARGE INSTRUCTIONS
Use prescribed medication as prescribed only  Follow-up your primary care provider or your OB/GYN as needed  Return emergency room for any worsening  Lidocaine patch only to be worn for 12 hours.  12 hours on and 12 hours off as needed.  Alternate OTC Motrin or Tylenol as needed for pain.

## 2024-11-19 NOTE — ED PROVIDER NOTES
See discussion above          The patient tolerated their visit well.  I evaluated the patient.  The physician was available for consultation as needed.  The patient and / or the family were informed of the results of any tests, a time was given to answer questions, a plan was proposed and they agreed with plan.     I am the Primary Clinician of Record.     CLINICAL IMPRESSION:  1. Right lower quadrant abdominal pain        DISPOSITION Decision To Discharge 11/19/2024 12:38:58 AM       PATIENT REFERRED TO:  Juliette Heller, APRN - CNP  [address] [address]  [phone]    Call   As needed      DISCHARGE MEDICATIONS:  New Prescriptions    LIDOCAINE (LIDODERM) 5 %    Place 1 patch onto the skin daily for 10 days 12 hours on, 12 hours off.       DISCONTINUED MEDICATIONS:  Discontinued Medications    No medications on file              (Please note the MDM and HPI sections of this note were completed with a voice recognition program.  Efforts were made to edit the dictations but occasionally words are mis-transcribed.)    Electronically signed, Aleksandar Masterson PA-C,          Aleksandar Masterson PA-C  11/20/24 [id number]

## 2024-11-20 ASSESSMENT — ENCOUNTER SYMPTOMS
SHORTNESS OF BREATH: 0
BACK PAIN: 0
COUGH: 0
NAUSEA: 0
EYE PAIN: 0
SORE THROAT: 0
VOMITING: 0
ABDOMINAL PAIN: 1

## 2025-08-12 ENCOUNTER — PATIENT MESSAGE (OUTPATIENT)
Dept: INTERNAL MEDICINE CLINIC | Age: 25
End: 2025-08-12

## 2025-08-27 ASSESSMENT — PATIENT HEALTH QUESTIONNAIRE - PHQ9
1. LITTLE INTEREST OR PLEASURE IN DOING THINGS: NOT AT ALL
2. FEELING DOWN, DEPRESSED OR HOPELESS: NOT AT ALL
SUM OF ALL RESPONSES TO PHQ9 QUESTIONS 1 & 2: 0
SUM OF ALL RESPONSES TO PHQ QUESTIONS 1-9: 0
SUM OF ALL RESPONSES TO PHQ QUESTIONS 1-9: 0
1. LITTLE INTEREST OR PLEASURE IN DOING THINGS: NOT AT ALL
SUM OF ALL RESPONSES TO PHQ QUESTIONS 1-9: 0
SUM OF ALL RESPONSES TO PHQ QUESTIONS 1-9: 0
2. FEELING DOWN, DEPRESSED OR HOPELESS: NOT AT ALL

## 2025-08-29 ENCOUNTER — OFFICE VISIT (OUTPATIENT)
Dept: INTERNAL MEDICINE CLINIC | Age: 25
End: 2025-08-29

## 2025-08-29 VITALS
SYSTOLIC BLOOD PRESSURE: 122 MMHG | BODY MASS INDEX: 32.24 KG/M2 | OXYGEN SATURATION: 98 % | WEIGHT: 187.8 LBS | HEART RATE: 94 BPM | DIASTOLIC BLOOD PRESSURE: 74 MMHG

## 2025-08-29 DIAGNOSIS — Z71.85 VACCINE COUNSELING: ICD-10-CM

## 2025-08-29 DIAGNOSIS — Z13.220 SCREENING, LIPID: ICD-10-CM

## 2025-08-29 DIAGNOSIS — J45.909 ASTHMA, UNSPECIFIED ASTHMA SEVERITY, UNSPECIFIED WHETHER COMPLICATED, UNSPECIFIED WHETHER PERSISTENT: ICD-10-CM

## 2025-08-29 DIAGNOSIS — E66.09 CLASS 1 OBESITY DUE TO EXCESS CALORIES WITHOUT SERIOUS COMORBIDITY WITH BODY MASS INDEX (BMI) OF 32.0 TO 32.9 IN ADULT: ICD-10-CM

## 2025-08-29 DIAGNOSIS — R41.840 DIFFICULTY CONCENTRATING: ICD-10-CM

## 2025-08-29 DIAGNOSIS — E66.811 CLASS 1 OBESITY DUE TO EXCESS CALORIES WITHOUT SERIOUS COMORBIDITY WITH BODY MASS INDEX (BMI) OF 32.0 TO 32.9 IN ADULT: ICD-10-CM

## 2025-08-29 DIAGNOSIS — Z00.00 ANNUAL PHYSICAL EXAM: Primary | ICD-10-CM

## 2025-08-29 DIAGNOSIS — E55.9 VITAMIN D DEFICIENCY: ICD-10-CM

## 2025-08-29 DIAGNOSIS — N92.0 MENORRHAGIA WITH REGULAR CYCLE: ICD-10-CM

## 2025-08-29 DIAGNOSIS — Z00.00 ANNUAL PHYSICAL EXAM: ICD-10-CM

## 2025-08-29 LAB
25(OH)D3 SERPL-MCNC: 26.5 NG/ML
ALBUMIN SERPL-MCNC: 4.5 G/DL (ref 3.4–5)
ALBUMIN/GLOB SERPL: 2 {RATIO} (ref 1.1–2.2)
ALP SERPL-CCNC: 37 U/L (ref 40–129)
ALT SERPL-CCNC: 9 U/L (ref 10–40)
ANION GAP SERPL CALCULATED.3IONS-SCNC: 8 MMOL/L (ref 3–16)
AST SERPL-CCNC: 20 U/L (ref 15–37)
BASOPHILS # BLD: 0.1 K/UL (ref 0–0.2)
BASOPHILS NFR BLD: 2 %
BILIRUB SERPL-MCNC: 0.8 MG/DL (ref 0–1)
BUN SERPL-MCNC: 11 MG/DL (ref 7–20)
CALCIUM SERPL-MCNC: 9.6 MG/DL (ref 8.3–10.6)
CHLORIDE SERPL-SCNC: 104 MMOL/L (ref 99–110)
CHOLEST SERPL-MCNC: 144 MG/DL (ref 0–199)
CO2 SERPL-SCNC: 27 MMOL/L (ref 21–32)
CREAT SERPL-MCNC: 0.7 MG/DL (ref 0.6–1.1)
DEPRECATED RDW RBC AUTO: 14.1 % (ref 12.4–15.4)
EOSINOPHIL # BLD: 0.1 K/UL (ref 0–0.6)
EOSINOPHIL NFR BLD: 2.5 %
GFR SERPLBLD CREATININE-BSD FMLA CKD-EPI: >90 ML/MIN/{1.73_M2}
GLUCOSE SERPL-MCNC: 88 MG/DL (ref 70–99)
HCT VFR BLD AUTO: 40.7 % (ref 36–48)
HDLC SERPL-MCNC: 53 MG/DL (ref 40–60)
HGB BLD-MCNC: 14.1 G/DL (ref 12–16)
LDL CHOLESTEROL: 84 MG/DL
LYMPHOCYTES # BLD: 1.1 K/UL (ref 1–5.1)
LYMPHOCYTES NFR BLD: 31.8 %
MCH RBC QN AUTO: 30.5 PG (ref 26–34)
MCHC RBC AUTO-ENTMCNC: 34.7 G/DL (ref 31–36)
MCV RBC AUTO: 87.8 FL (ref 80–100)
MONOCYTES # BLD: 0.4 K/UL (ref 0–1.3)
MONOCYTES NFR BLD: 9.7 %
NEUTROPHILS # BLD: 1.9 K/UL (ref 1.7–7.7)
NEUTROPHILS NFR BLD: 54 %
PLATELET # BLD AUTO: 216 K/UL (ref 135–450)
PMV BLD AUTO: 9.7 FL (ref 5–10.5)
POTASSIUM SERPL-SCNC: 4.1 MMOL/L (ref 3.5–5.1)
PROT SERPL-MCNC: 6.8 G/DL (ref 6.4–8.2)
RBC # BLD AUTO: 4.63 M/UL (ref 4–5.2)
SODIUM SERPL-SCNC: 139 MMOL/L (ref 136–145)
TRIGL SERPL-MCNC: 36 MG/DL (ref 0–150)
TSH SERPL DL<=0.005 MIU/L-ACNC: 1.72 UIU/ML (ref 0.27–4.2)
VLDLC SERPL CALC-MCNC: 7 MG/DL
WBC # BLD AUTO: 3.6 K/UL (ref 4–11)

## 2025-08-29 SDOH — ECONOMIC STABILITY: FOOD INSECURITY: WITHIN THE PAST 12 MONTHS, YOU WORRIED THAT YOUR FOOD WOULD RUN OUT BEFORE YOU GOT MONEY TO BUY MORE.: NEVER TRUE

## 2025-08-29 SDOH — ECONOMIC STABILITY: FOOD INSECURITY: WITHIN THE PAST 12 MONTHS, THE FOOD YOU BOUGHT JUST DIDN'T LAST AND YOU DIDN'T HAVE MONEY TO GET MORE.: NEVER TRUE

## 2025-08-29 ASSESSMENT — ENCOUNTER SYMPTOMS
DIARRHEA: 0
ABDOMINAL PAIN: 0
SHORTNESS OF BREATH: 0
VOMITING: 0
WHEEZING: 0
COUGH: 0
NAUSEA: 0
CONSTIPATION: 0